# Patient Record
Sex: FEMALE | Race: WHITE | NOT HISPANIC OR LATINO
[De-identification: names, ages, dates, MRNs, and addresses within clinical notes are randomized per-mention and may not be internally consistent; named-entity substitution may affect disease eponyms.]

---

## 2017-01-03 ENCOUNTER — RX RENEWAL (OUTPATIENT)
Age: 72
End: 2017-01-03

## 2018-02-26 ENCOUNTER — APPOINTMENT (OUTPATIENT)
Dept: ORTHOPEDIC SURGERY | Facility: CLINIC | Age: 73
End: 2018-02-26
Payer: COMMERCIAL

## 2018-02-26 VITALS — BODY MASS INDEX: 21.16 KG/M2 | WEIGHT: 115 LBS | HEIGHT: 62 IN

## 2018-02-26 VITALS — HEART RATE: 91 BPM | SYSTOLIC BLOOD PRESSURE: 124 MMHG | DIASTOLIC BLOOD PRESSURE: 74 MMHG

## 2018-02-26 PROCEDURE — 72100 X-RAY EXAM L-S SPINE 2/3 VWS: CPT

## 2018-02-26 PROCEDURE — 99214 OFFICE O/P EST MOD 30 MIN: CPT

## 2018-03-07 ENCOUNTER — RX RENEWAL (OUTPATIENT)
Age: 73
End: 2018-03-07

## 2018-05-10 ENCOUNTER — RX RENEWAL (OUTPATIENT)
Age: 73
End: 2018-05-10

## 2018-06-05 ENCOUNTER — OUTPATIENT (OUTPATIENT)
Dept: OUTPATIENT SERVICES | Facility: HOSPITAL | Age: 73
LOS: 1 days | End: 2018-06-05
Payer: COMMERCIAL

## 2018-06-05 VITALS
RESPIRATION RATE: 20 BRPM | TEMPERATURE: 98 F | SYSTOLIC BLOOD PRESSURE: 110 MMHG | OXYGEN SATURATION: 97 % | HEIGHT: 61 IN | HEART RATE: 74 BPM | DIASTOLIC BLOOD PRESSURE: 70 MMHG | WEIGHT: 115.96 LBS

## 2018-06-05 DIAGNOSIS — M48.07 SPINAL STENOSIS, LUMBOSACRAL REGION: ICD-10-CM

## 2018-06-05 DIAGNOSIS — M48.00 SPINAL STENOSIS, SITE UNSPECIFIED: ICD-10-CM

## 2018-06-05 DIAGNOSIS — Z90.49 ACQUIRED ABSENCE OF OTHER SPECIFIED PARTS OF DIGESTIVE TRACT: Chronic | ICD-10-CM

## 2018-06-05 DIAGNOSIS — T78.40XA ALLERGY, UNSPECIFIED, INITIAL ENCOUNTER: ICD-10-CM

## 2018-06-05 DIAGNOSIS — K43.2 INCISIONAL HERNIA WITHOUT OBSTRUCTION OR GANGRENE: Chronic | ICD-10-CM

## 2018-06-05 LAB
BLD GP AB SCN SERPL QL: NEGATIVE — SIGNIFICANT CHANGE UP
BUN SERPL-MCNC: 16 MG/DL — SIGNIFICANT CHANGE UP (ref 7–23)
CALCIUM SERPL-MCNC: 9.3 MG/DL — SIGNIFICANT CHANGE UP (ref 8.4–10.5)
CHLORIDE SERPL-SCNC: 101 MMOL/L — SIGNIFICANT CHANGE UP (ref 98–107)
CO2 SERPL-SCNC: 27 MMOL/L — SIGNIFICANT CHANGE UP (ref 22–31)
CREAT SERPL-MCNC: 0.7 MG/DL — SIGNIFICANT CHANGE UP (ref 0.5–1.3)
GLUCOSE SERPL-MCNC: 89 MG/DL — SIGNIFICANT CHANGE UP (ref 70–99)
HCT VFR BLD CALC: 44.8 % — SIGNIFICANT CHANGE UP (ref 34.5–45)
HGB BLD-MCNC: 14.8 G/DL — SIGNIFICANT CHANGE UP (ref 11.5–15.5)
MCHC RBC-ENTMCNC: 30.9 PG — SIGNIFICANT CHANGE UP (ref 27–34)
MCHC RBC-ENTMCNC: 33 % — SIGNIFICANT CHANGE UP (ref 32–36)
MCV RBC AUTO: 93.5 FL — SIGNIFICANT CHANGE UP (ref 80–100)
NRBC # FLD: 0 — SIGNIFICANT CHANGE UP
PLATELET # BLD AUTO: 298 K/UL — SIGNIFICANT CHANGE UP (ref 150–400)
PMV BLD: 10.6 FL — SIGNIFICANT CHANGE UP (ref 7–13)
POTASSIUM SERPL-MCNC: 4.3 MMOL/L — SIGNIFICANT CHANGE UP (ref 3.5–5.3)
POTASSIUM SERPL-SCNC: 4.3 MMOL/L — SIGNIFICANT CHANGE UP (ref 3.5–5.3)
RBC # BLD: 4.79 M/UL — SIGNIFICANT CHANGE UP (ref 3.8–5.2)
RBC # FLD: 13.2 % — SIGNIFICANT CHANGE UP (ref 10.3–14.5)
RH IG SCN BLD-IMP: NEGATIVE — SIGNIFICANT CHANGE UP
SODIUM SERPL-SCNC: 140 MMOL/L — SIGNIFICANT CHANGE UP (ref 135–145)
WBC # BLD: 12.5 K/UL — HIGH (ref 3.8–10.5)
WBC # FLD AUTO: 12.5 K/UL — HIGH (ref 3.8–10.5)

## 2018-06-05 PROCEDURE — 93010 ELECTROCARDIOGRAM REPORT: CPT

## 2018-06-05 NOTE — H&P PST ADULT - HISTORY OF PRESENT ILLNESS
Pt is a 72 y.o. female ; pt reports h/o chronic lower back pain first noted 2 years ago ; pt to surgeon ; a w/u was done " hernaited disc" Pt now presents for Lumbar Laminectomy L5-S1 Pt is a 72 y.o. female ; pt reports h/o chronic lower back pain first noted 2 years ago ; pt to surgeon ; a w/u was done " herniated disc" Pt now presents for Lumbar Laminectomy L5-S1

## 2018-06-05 NOTE — H&P PST ADULT - PROBLEM SELECTOR PLAN 1
Lumbar Laminectomy    Pre op instructions including Pepcid given to pt ; pt appears to have a good understanding of pre op instructions    Pt to Dr Bowman for pre op m/c

## 2018-06-05 NOTE — H&P PST ADULT - FAMILY HISTORY
Sibling  Still living? Yes, Estimated age: Age Unknown  Family history of breast cancer, Age at diagnosis: Age Unknown

## 2018-06-05 NOTE — H&P PST ADULT - PMH
Diverticulitis  last flare up > 3 years ago  Hypercholesterolemia    Lumbar disc herniation Diverticulitis  last flare up > 3 years ago  Hypercholesterolemia    Lumbar disc herniation    Smoking history

## 2018-06-05 NOTE — H&P PST ADULT - DOCUMENT STATUS
August 31, 2017      Estefani Feliciano PA-C  [address]  [facility] [address]           [facility]  [address] [facility] [address]  Phone: [phone]  Fax: [phone]          Patient: Reema Barrios   MR Number: [id number]   YOB: 1973   Date of Visit: 8/31/2017       Dear Estefani Feliciano:    Thank you for referring Reema Barrios to me for evaluation. Attached you will find relevant portions of my assessment and plan of care.    If you have questions, please do not hesitate to call me. I look forward to following Reema Barrios along with you.    Sincerely,    Tay Lei MD    Enclosure  CC:  No Recipients    If you would like to receive this communication electronically, please contact [email or web address] or [phone] to request more information on [facility] access.    For providers and/or their staff who would like to refer a patient to [facility], please contact us through our one-stop-shop provider referral line, [facility], at [phone].    If you feel you have received this communication in error or would no longer like to receive these types of communications, please e-mail [email or web address]          Authored by Resident/PA/NP

## 2018-06-05 NOTE — H&P PST ADULT - RS GEN PE MLT RESP DETAILS PC
clear to auscultation bilaterally/respirations non-labored/good air movement/o2 sat 97%/airway patent/breath sounds equal

## 2018-06-05 NOTE — H&P PST ADULT - PSH
Incisional hernia  s/p repair  S/P appendectomy    S/P Colectomy  sigmoid resection  S/P Hysterectomy

## 2018-06-05 NOTE — H&P PST ADULT - LYMPHATIC
supraclavicular R/supraclavicular L/anterior cervical R/posterior cervical R/anterior cervical L/posterior cervical L

## 2018-06-05 NOTE — H&P PST ADULT - NSANTHOSAYNRD_GEN_A_CORE
No. VAZQUEZ screening performed.  STOP BANG Legend: 0-2 = LOW Risk; 3-4 = INTERMEDIATE Risk; 5-8 = HIGH Risk

## 2018-06-06 LAB — SPECIMEN SOURCE: SIGNIFICANT CHANGE UP

## 2018-06-07 LAB — BACTERIA NPH CULT: SIGNIFICANT CHANGE UP

## 2018-06-18 NOTE — ASU PATIENT PROFILE, ADULT - PMH
Diverticulitis  last flare up > 3 years ago  Hypercholesterolemia    Lumbar disc herniation    Smoking history

## 2018-06-19 ENCOUNTER — APPOINTMENT (OUTPATIENT)
Dept: ORTHOPEDIC SURGERY | Facility: HOSPITAL | Age: 73
End: 2018-06-19
Payer: COMMERCIAL

## 2018-06-19 ENCOUNTER — RESULT REVIEW (OUTPATIENT)
Age: 73
End: 2018-06-19

## 2018-06-19 ENCOUNTER — INPATIENT (INPATIENT)
Facility: HOSPITAL | Age: 73
LOS: 2 days | Discharge: HOME | End: 2018-06-22
Attending: ORTHOPAEDIC SURGERY | Admitting: ORTHOPAEDIC SURGERY
Payer: COMMERCIAL

## 2018-06-19 VITALS
RESPIRATION RATE: 16 BRPM | WEIGHT: 115.96 LBS | TEMPERATURE: 99 F | DIASTOLIC BLOOD PRESSURE: 72 MMHG | OXYGEN SATURATION: 98 % | SYSTOLIC BLOOD PRESSURE: 123 MMHG | HEIGHT: 61 IN | HEART RATE: 70 BPM

## 2018-06-19 DIAGNOSIS — Z90.49 ACQUIRED ABSENCE OF OTHER SPECIFIED PARTS OF DIGESTIVE TRACT: Chronic | ICD-10-CM

## 2018-06-19 DIAGNOSIS — K43.2 INCISIONAL HERNIA WITHOUT OBSTRUCTION OR GANGRENE: Chronic | ICD-10-CM

## 2018-06-19 DIAGNOSIS — M48.07 SPINAL STENOSIS, LUMBOSACRAL REGION: ICD-10-CM

## 2018-06-19 LAB
BASOPHILS # BLD AUTO: 0.03 K/UL — SIGNIFICANT CHANGE UP (ref 0–0.2)
BASOPHILS NFR BLD AUTO: 0.2 % — SIGNIFICANT CHANGE UP (ref 0–2)
BUN SERPL-MCNC: 15 MG/DL — SIGNIFICANT CHANGE UP (ref 7–23)
CALCIUM SERPL-MCNC: 8.5 MG/DL — SIGNIFICANT CHANGE UP (ref 8.4–10.5)
CHLORIDE SERPL-SCNC: 101 MMOL/L — SIGNIFICANT CHANGE UP (ref 98–107)
CO2 SERPL-SCNC: 25 MMOL/L — SIGNIFICANT CHANGE UP (ref 22–31)
CREAT SERPL-MCNC: 0.68 MG/DL — SIGNIFICANT CHANGE UP (ref 0.5–1.3)
EOSINOPHIL # BLD AUTO: 0.01 K/UL — SIGNIFICANT CHANGE UP (ref 0–0.5)
EOSINOPHIL NFR BLD AUTO: 0.1 % — SIGNIFICANT CHANGE UP (ref 0–6)
GLUCOSE SERPL-MCNC: 141 MG/DL — HIGH (ref 70–99)
HCT VFR BLD CALC: 41.3 % — SIGNIFICANT CHANGE UP (ref 34.5–45)
HGB BLD-MCNC: 13.8 G/DL — SIGNIFICANT CHANGE UP (ref 11.5–15.5)
IMM GRANULOCYTES # BLD AUTO: 0.05 # — SIGNIFICANT CHANGE UP
IMM GRANULOCYTES NFR BLD AUTO: 0.4 % — SIGNIFICANT CHANGE UP (ref 0–1.5)
LYMPHOCYTES # BLD AUTO: 0.79 K/UL — LOW (ref 1–3.3)
LYMPHOCYTES # BLD AUTO: 5.9 % — LOW (ref 13–44)
MCHC RBC-ENTMCNC: 31.3 PG — SIGNIFICANT CHANGE UP (ref 27–34)
MCHC RBC-ENTMCNC: 33.4 % — SIGNIFICANT CHANGE UP (ref 32–36)
MCV RBC AUTO: 93.7 FL — SIGNIFICANT CHANGE UP (ref 80–100)
MONOCYTES # BLD AUTO: 0.11 K/UL — SIGNIFICANT CHANGE UP (ref 0–0.9)
MONOCYTES NFR BLD AUTO: 0.8 % — LOW (ref 2–14)
NEUTROPHILS # BLD AUTO: 12.38 K/UL — HIGH (ref 1.8–7.4)
NEUTROPHILS NFR BLD AUTO: 92.6 % — HIGH (ref 43–77)
NRBC # FLD: 0 — SIGNIFICANT CHANGE UP
PLATELET # BLD AUTO: 283 K/UL — SIGNIFICANT CHANGE UP (ref 150–400)
PMV BLD: 10.3 FL — SIGNIFICANT CHANGE UP (ref 7–13)
POTASSIUM SERPL-MCNC: 3.9 MMOL/L — SIGNIFICANT CHANGE UP (ref 3.5–5.3)
POTASSIUM SERPL-SCNC: 3.9 MMOL/L — SIGNIFICANT CHANGE UP (ref 3.5–5.3)
RBC # BLD: 4.41 M/UL — SIGNIFICANT CHANGE UP (ref 3.8–5.2)
RBC # FLD: 13.2 % — SIGNIFICANT CHANGE UP (ref 10.3–14.5)
RH IG SCN BLD-IMP: NEGATIVE — SIGNIFICANT CHANGE UP
SODIUM SERPL-SCNC: 138 MMOL/L — SIGNIFICANT CHANGE UP (ref 135–145)
WBC # BLD: 13.37 K/UL — HIGH (ref 3.8–10.5)
WBC # FLD AUTO: 13.37 K/UL — HIGH (ref 3.8–10.5)

## 2018-06-19 PROCEDURE — 63030 LAMOT DCMPRN NRV RT 1 LMBR: CPT | Mod: AS,59,LT

## 2018-06-19 PROCEDURE — 88304 TISSUE EXAM BY PATHOLOGIST: CPT | Mod: 26

## 2018-06-19 PROCEDURE — 72100 X-RAY EXAM L-S SPINE 2/3 VWS: CPT | Mod: 26

## 2018-06-19 PROCEDURE — 63011 REMOVE SPINE LAMINA 1/2 SCRL: CPT | Mod: AS

## 2018-06-19 PROCEDURE — 63047 LAM FACETEC & FORAMOT LUMBAR: CPT | Mod: AS

## 2018-06-19 RX ORDER — PSYLLIUM SEED (WITH DEXTROSE)
1 POWDER (GRAM) ORAL DAILY
Qty: 0 | Refills: 0 | Status: DISCONTINUED | OUTPATIENT
Start: 2018-06-19 | End: 2018-06-22

## 2018-06-19 RX ORDER — FENTANYL CITRATE 50 UG/ML
25 INJECTION INTRAVENOUS
Qty: 0 | Refills: 0 | Status: DISCONTINUED | OUTPATIENT
Start: 2018-06-19 | End: 2018-06-19

## 2018-06-19 RX ORDER — ONDANSETRON 8 MG/1
4 TABLET, FILM COATED ORAL EVERY 6 HOURS
Qty: 0 | Refills: 0 | Status: DISCONTINUED | OUTPATIENT
Start: 2018-06-19 | End: 2018-06-22

## 2018-06-19 RX ORDER — ATORVASTATIN CALCIUM 80 MG/1
20 TABLET, FILM COATED ORAL AT BEDTIME
Qty: 0 | Refills: 0 | Status: DISCONTINUED | OUTPATIENT
Start: 2018-06-19 | End: 2018-06-22

## 2018-06-19 RX ORDER — HYDROMORPHONE HYDROCHLORIDE 2 MG/ML
0.5 INJECTION INTRAMUSCULAR; INTRAVENOUS; SUBCUTANEOUS EVERY 4 HOURS
Qty: 0 | Refills: 0 | Status: DISCONTINUED | OUTPATIENT
Start: 2018-06-19 | End: 2018-06-22

## 2018-06-19 RX ORDER — HYDROMORPHONE HYDROCHLORIDE 2 MG/ML
0.5 INJECTION INTRAMUSCULAR; INTRAVENOUS; SUBCUTANEOUS
Qty: 0 | Refills: 0 | Status: DISCONTINUED | OUTPATIENT
Start: 2018-06-19 | End: 2018-06-19

## 2018-06-19 RX ORDER — CEFAZOLIN SODIUM 1 G
2000 VIAL (EA) INJECTION EVERY 8 HOURS
Qty: 0 | Refills: 0 | Status: COMPLETED | OUTPATIENT
Start: 2018-06-19 | End: 2018-06-20

## 2018-06-19 RX ORDER — PANTOPRAZOLE SODIUM 20 MG/1
40 TABLET, DELAYED RELEASE ORAL
Qty: 0 | Refills: 0 | Status: DISCONTINUED | OUTPATIENT
Start: 2018-06-19 | End: 2018-06-22

## 2018-06-19 RX ORDER — SENNA PLUS 8.6 MG/1
2 TABLET ORAL AT BEDTIME
Qty: 0 | Refills: 0 | Status: DISCONTINUED | OUTPATIENT
Start: 2018-06-19 | End: 2018-06-22

## 2018-06-19 RX ORDER — ONDANSETRON 8 MG/1
4 TABLET, FILM COATED ORAL ONCE
Qty: 0 | Refills: 0 | Status: COMPLETED | OUTPATIENT
Start: 2018-06-19 | End: 2018-06-19

## 2018-06-19 RX ORDER — SODIUM CHLORIDE 9 MG/ML
1000 INJECTION, SOLUTION INTRAVENOUS
Qty: 0 | Refills: 0 | Status: DISCONTINUED | OUTPATIENT
Start: 2018-06-19 | End: 2018-06-22

## 2018-06-19 RX ORDER — ACETAMINOPHEN 500 MG
650 TABLET ORAL EVERY 6 HOURS
Qty: 0 | Refills: 0 | Status: DISCONTINUED | OUTPATIENT
Start: 2018-06-19 | End: 2018-06-21

## 2018-06-19 RX ORDER — OXYCODONE HYDROCHLORIDE 5 MG/1
10 TABLET ORAL EVERY 4 HOURS
Qty: 0 | Refills: 0 | Status: DISCONTINUED | OUTPATIENT
Start: 2018-06-19 | End: 2018-06-21

## 2018-06-19 RX ORDER — CALCIUM CARBONATE 500(1250)
1 TABLET ORAL THREE TIMES A DAY
Qty: 0 | Refills: 0 | Status: DISCONTINUED | OUTPATIENT
Start: 2018-06-19 | End: 2018-06-22

## 2018-06-19 RX ORDER — SODIUM CHLORIDE 9 MG/ML
500 INJECTION, SOLUTION INTRAVENOUS ONCE
Qty: 0 | Refills: 0 | Status: COMPLETED | OUTPATIENT
Start: 2018-06-19 | End: 2018-06-19

## 2018-06-19 RX ORDER — MAGNESIUM HYDROXIDE 400 MG/1
30 TABLET, CHEWABLE ORAL EVERY 12 HOURS
Qty: 0 | Refills: 0 | Status: DISCONTINUED | OUTPATIENT
Start: 2018-06-19 | End: 2018-06-22

## 2018-06-19 RX ORDER — OXYCODONE HYDROCHLORIDE 5 MG/1
5 TABLET ORAL EVERY 4 HOURS
Qty: 0 | Refills: 0 | Status: DISCONTINUED | OUTPATIENT
Start: 2018-06-19 | End: 2018-06-21

## 2018-06-19 RX ORDER — NICOTINE POLACRILEX 2 MG
1 GUM BUCCAL DAILY
Qty: 0 | Refills: 0 | Status: DISCONTINUED | OUTPATIENT
Start: 2018-06-19 | End: 2018-06-20

## 2018-06-19 RX ORDER — METOCLOPRAMIDE HCL 10 MG
10 TABLET ORAL ONCE
Qty: 0 | Refills: 0 | Status: COMPLETED | OUTPATIENT
Start: 2018-06-19 | End: 2018-06-19

## 2018-06-19 RX ORDER — DIAZEPAM 5 MG
5 TABLET ORAL EVERY 12 HOURS
Qty: 0 | Refills: 0 | Status: DISCONTINUED | OUTPATIENT
Start: 2018-06-19 | End: 2018-06-22

## 2018-06-19 RX ADMIN — HYDROMORPHONE HYDROCHLORIDE 0.5 MILLIGRAM(S): 2 INJECTION INTRAMUSCULAR; INTRAVENOUS; SUBCUTANEOUS at 14:45

## 2018-06-19 RX ADMIN — OXYCODONE HYDROCHLORIDE 5 MILLIGRAM(S): 5 TABLET ORAL at 18:57

## 2018-06-19 RX ADMIN — ATORVASTATIN CALCIUM 20 MILLIGRAM(S): 80 TABLET, FILM COATED ORAL at 21:14

## 2018-06-19 RX ADMIN — Medication 650 MILLIGRAM(S): at 21:14

## 2018-06-19 RX ADMIN — Medication 10 MILLIGRAM(S): at 15:17

## 2018-06-19 RX ADMIN — HYDROMORPHONE HYDROCHLORIDE 0.5 MILLIGRAM(S): 2 INJECTION INTRAMUSCULAR; INTRAVENOUS; SUBCUTANEOUS at 14:30

## 2018-06-19 RX ADMIN — SENNA PLUS 2 TABLET(S): 8.6 TABLET ORAL at 21:14

## 2018-06-19 RX ADMIN — Medication 100 MILLIGRAM(S): at 19:49

## 2018-06-19 RX ADMIN — ONDANSETRON 4 MILLIGRAM(S): 8 TABLET, FILM COATED ORAL at 16:23

## 2018-06-19 RX ADMIN — OXYCODONE HYDROCHLORIDE 10 MILLIGRAM(S): 5 TABLET ORAL at 23:21

## 2018-06-19 RX ADMIN — OXYCODONE HYDROCHLORIDE 10 MILLIGRAM(S): 5 TABLET ORAL at 23:20

## 2018-06-19 RX ADMIN — ONDANSETRON 4 MILLIGRAM(S): 8 TABLET, FILM COATED ORAL at 14:40

## 2018-06-19 RX ADMIN — SODIUM CHLORIDE 1000 MILLILITER(S): 9 INJECTION, SOLUTION INTRAVENOUS at 16:24

## 2018-06-19 NOTE — BRIEF OPERATIVE NOTE - PROCEDURE
<<-----Click on this checkbox to enter Procedure Laminectomy of lumbosacral spine  06/19/2018    Active  ASATIN

## 2018-06-20 ENCOUNTER — TRANSCRIPTION ENCOUNTER (OUTPATIENT)
Age: 73
End: 2018-06-20

## 2018-06-20 DIAGNOSIS — M51.26 OTHER INTERVERTEBRAL DISC DISPLACEMENT, LUMBAR REGION: ICD-10-CM

## 2018-06-20 DIAGNOSIS — Z87.891 PERSONAL HISTORY OF NICOTINE DEPENDENCE: ICD-10-CM

## 2018-06-20 DIAGNOSIS — D72.829 ELEVATED WHITE BLOOD CELL COUNT, UNSPECIFIED: ICD-10-CM

## 2018-06-20 DIAGNOSIS — E78.00 PURE HYPERCHOLESTEROLEMIA, UNSPECIFIED: ICD-10-CM

## 2018-06-20 DIAGNOSIS — R11.0 NAUSEA: ICD-10-CM

## 2018-06-20 LAB
BUN SERPL-MCNC: 13 MG/DL — SIGNIFICANT CHANGE UP (ref 7–23)
BUN SERPL-MCNC: 13 MG/DL — SIGNIFICANT CHANGE UP (ref 7–23)
CALCIUM SERPL-MCNC: 8.6 MG/DL — SIGNIFICANT CHANGE UP (ref 8.4–10.5)
CALCIUM SERPL-MCNC: 8.7 MG/DL — SIGNIFICANT CHANGE UP (ref 8.4–10.5)
CHLORIDE SERPL-SCNC: 101 MMOL/L — SIGNIFICANT CHANGE UP (ref 98–107)
CHLORIDE SERPL-SCNC: 103 MMOL/L — SIGNIFICANT CHANGE UP (ref 98–107)
CO2 SERPL-SCNC: 26 MMOL/L — SIGNIFICANT CHANGE UP (ref 22–31)
CO2 SERPL-SCNC: 28 MMOL/L — SIGNIFICANT CHANGE UP (ref 22–31)
CREAT SERPL-MCNC: 0.58 MG/DL — SIGNIFICANT CHANGE UP (ref 0.5–1.3)
CREAT SERPL-MCNC: 0.59 MG/DL — SIGNIFICANT CHANGE UP (ref 0.5–1.3)
GLUCOSE SERPL-MCNC: 112 MG/DL — HIGH (ref 70–99)
GLUCOSE SERPL-MCNC: 120 MG/DL — HIGH (ref 70–99)
HCT VFR BLD CALC: 38.5 % — SIGNIFICANT CHANGE UP (ref 34.5–45)
HGB BLD-MCNC: 13.2 G/DL — SIGNIFICANT CHANGE UP (ref 11.5–15.5)
MCHC RBC-ENTMCNC: 31.1 PG — SIGNIFICANT CHANGE UP (ref 27–34)
MCHC RBC-ENTMCNC: 34.3 % — SIGNIFICANT CHANGE UP (ref 32–36)
MCV RBC AUTO: 90.6 FL — SIGNIFICANT CHANGE UP (ref 80–100)
NRBC # FLD: 0 — SIGNIFICANT CHANGE UP
PLATELET # BLD AUTO: 240 K/UL — SIGNIFICANT CHANGE UP (ref 150–400)
PMV BLD: 10 FL — SIGNIFICANT CHANGE UP (ref 7–13)
POTASSIUM SERPL-MCNC: 4.1 MMOL/L — SIGNIFICANT CHANGE UP (ref 3.5–5.3)
POTASSIUM SERPL-MCNC: 4.5 MMOL/L — SIGNIFICANT CHANGE UP (ref 3.5–5.3)
POTASSIUM SERPL-SCNC: 4.1 MMOL/L — SIGNIFICANT CHANGE UP (ref 3.5–5.3)
POTASSIUM SERPL-SCNC: 4.5 MMOL/L — SIGNIFICANT CHANGE UP (ref 3.5–5.3)
RBC # BLD: 4.25 M/UL — SIGNIFICANT CHANGE UP (ref 3.8–5.2)
RBC # FLD: 13.2 % — SIGNIFICANT CHANGE UP (ref 10.3–14.5)
SODIUM SERPL-SCNC: 139 MMOL/L — SIGNIFICANT CHANGE UP (ref 135–145)
SODIUM SERPL-SCNC: 143 MMOL/L — SIGNIFICANT CHANGE UP (ref 135–145)
WBC # BLD: 17.94 K/UL — HIGH (ref 3.8–10.5)
WBC # FLD AUTO: 17.94 K/UL — HIGH (ref 3.8–10.5)

## 2018-06-20 PROCEDURE — 99223 1ST HOSP IP/OBS HIGH 75: CPT

## 2018-06-20 RX ORDER — SODIUM CHLORIDE 9 MG/ML
500 INJECTION INTRAMUSCULAR; INTRAVENOUS; SUBCUTANEOUS ONCE
Qty: 0 | Refills: 0 | Status: COMPLETED | OUTPATIENT
Start: 2018-06-20 | End: 2018-06-20

## 2018-06-20 RX ORDER — METOCLOPRAMIDE HCL 10 MG
10 TABLET ORAL ONCE
Qty: 0 | Refills: 0 | Status: COMPLETED | OUTPATIENT
Start: 2018-06-20 | End: 2018-06-20

## 2018-06-20 RX ORDER — BENZOCAINE AND MENTHOL 5; 1 G/100ML; G/100ML
1 LIQUID ORAL
Qty: 0 | Refills: 0 | Status: DISCONTINUED | OUTPATIENT
Start: 2018-06-20 | End: 2018-06-22

## 2018-06-20 RX ORDER — NICOTINE POLACRILEX 2 MG
1 GUM BUCCAL DAILY
Qty: 0 | Refills: 0 | Status: DISCONTINUED | OUTPATIENT
Start: 2018-06-20 | End: 2018-06-22

## 2018-06-20 RX ORDER — DOCUSATE SODIUM 100 MG
100 CAPSULE ORAL THREE TIMES A DAY
Qty: 0 | Refills: 0 | Status: DISCONTINUED | OUTPATIENT
Start: 2018-06-20 | End: 2018-06-22

## 2018-06-20 RX ADMIN — BENZOCAINE AND MENTHOL 1 LOZENGE: 5; 1 LIQUID ORAL at 09:12

## 2018-06-20 RX ADMIN — Medication 10 MILLIGRAM(S): at 09:12

## 2018-06-20 RX ADMIN — Medication 100 MILLIGRAM(S): at 22:38

## 2018-06-20 RX ADMIN — HYDROMORPHONE HYDROCHLORIDE 0.5 MILLIGRAM(S): 2 INJECTION INTRAMUSCULAR; INTRAVENOUS; SUBCUTANEOUS at 01:27

## 2018-06-20 RX ADMIN — HYDROMORPHONE HYDROCHLORIDE 0.5 MILLIGRAM(S): 2 INJECTION INTRAMUSCULAR; INTRAVENOUS; SUBCUTANEOUS at 18:40

## 2018-06-20 RX ADMIN — PANTOPRAZOLE SODIUM 40 MILLIGRAM(S): 20 TABLET, DELAYED RELEASE ORAL at 06:50

## 2018-06-20 RX ADMIN — HYDROMORPHONE HYDROCHLORIDE 0.5 MILLIGRAM(S): 2 INJECTION INTRAMUSCULAR; INTRAVENOUS; SUBCUTANEOUS at 18:55

## 2018-06-20 RX ADMIN — Medication 1 TABLET(S): at 12:39

## 2018-06-20 RX ADMIN — Medication 5 MILLIGRAM(S): at 12:39

## 2018-06-20 RX ADMIN — ONDANSETRON 4 MILLIGRAM(S): 8 TABLET, FILM COATED ORAL at 22:37

## 2018-06-20 RX ADMIN — OXYCODONE HYDROCHLORIDE 10 MILLIGRAM(S): 5 TABLET ORAL at 23:31

## 2018-06-20 RX ADMIN — ONDANSETRON 4 MILLIGRAM(S): 8 TABLET, FILM COATED ORAL at 15:39

## 2018-06-20 RX ADMIN — OXYCODONE HYDROCHLORIDE 5 MILLIGRAM(S): 5 TABLET ORAL at 16:30

## 2018-06-20 RX ADMIN — ATORVASTATIN CALCIUM 20 MILLIGRAM(S): 80 TABLET, FILM COATED ORAL at 22:38

## 2018-06-20 RX ADMIN — OXYCODONE HYDROCHLORIDE 5 MILLIGRAM(S): 5 TABLET ORAL at 15:39

## 2018-06-20 RX ADMIN — Medication 100 MILLIGRAM(S): at 03:58

## 2018-06-20 RX ADMIN — SENNA PLUS 2 TABLET(S): 8.6 TABLET ORAL at 22:38

## 2018-06-20 RX ADMIN — SODIUM CHLORIDE 2000 MILLILITER(S): 9 INJECTION INTRAMUSCULAR; INTRAVENOUS; SUBCUTANEOUS at 09:13

## 2018-06-20 RX ADMIN — OXYCODONE HYDROCHLORIDE 10 MILLIGRAM(S): 5 TABLET ORAL at 22:37

## 2018-06-20 RX ADMIN — ONDANSETRON 4 MILLIGRAM(S): 8 TABLET, FILM COATED ORAL at 05:29

## 2018-06-20 RX ADMIN — HYDROMORPHONE HYDROCHLORIDE 0.5 MILLIGRAM(S): 2 INJECTION INTRAMUSCULAR; INTRAVENOUS; SUBCUTANEOUS at 05:30

## 2018-06-20 RX ADMIN — Medication 100 MILLIGRAM(S): at 14:29

## 2018-06-20 RX ADMIN — Medication 1 PATCH: at 14:40

## 2018-06-20 NOTE — DISCHARGE NOTE ADULT - NS AS DC FOLLOWUP STROKE INST
Smoking Cessation/carenotes on laminectomy, d/c medications, side effects pamphlet, stop smoking carenotes

## 2018-06-20 NOTE — DISCHARGE NOTE ADULT - MEDICATION SUMMARY - MEDICATIONS TO STOP TAKING
I will STOP taking the medications listed below when I get home from the hospital:    diclofenac sodium 75 mg oral delayed release tablet  -- 1 tab(s) by mouth once a day. loast dose 6/12/2018    Aleve  -- 1-2 tab oral daily. last dose 6/12/2018

## 2018-06-20 NOTE — DISCHARGE NOTE ADULT - MEDICATION SUMMARY - MEDICATIONS TO TAKE
I will START or STAY ON the medications listed below when I get home from the hospital:    Vitamin D  -- 1 tab(s) by mouth once a day  -- Indication: For Home med    HYDROmorphone 2 mg oral tablet  -- 1-2 tab(s) by mouth every 4- 6 hours, As Needed Pain MDD:12  -- Indication: For pain med    Caltrate 600 mg oral tablet  -- 1 tab(s) by mouth once a day  -- Indication: For Home med    diazePAM 5 mg oral tablet  -- 1 tab(s) by mouth every 12 hours, As needed, Spasm MDD:2  -- Indication: For muscle spasms    atorvastatin 20 mg oral tablet  -- 1 tab(s) by mouth once a day  -- Indication: For Home med    Metamucil  -- orally once a day  -- Indication: For Home med    docusate sodium 100 mg oral capsule  -- 1 cap(s) by mouth 3 times a day  -- Indication: For Stool softener    senna oral tablet  -- 2 tab(s) by mouth once a day (at bedtime), As Needed - for constipation  -- Indication: For Laxative    pantoprazole 40 mg oral delayed release tablet  -- 1 tab(s) by mouth once a day (before a meal)  -- Indication: For GI protection post op    multivitamin  -- 1 tab(s) by mouth once a day. last dose 6/12/2018.  -- Indication: For Home med    Vitamin C  -- 1 tab(s) by mouth once a day  -- Indication: For Home med

## 2018-06-20 NOTE — DISCHARGE NOTE ADULT - INSTRUCTIONS
no change Notify Dr Villa if you experience any increase in pain not relieved with pain medication, any redness, drainage or swelling around incision or any fever >100.5  [place]n proper body alignment, no bending or twisting at the waist, no heavy lifting.  Do not sit in a chair for longer than 45 minutes at a time.  Dink plenty of fluids.  Notify Dr Braxton if you develop any change in bowel or bladder control, or if you develop any numbness or tingling in extremities..  Use over the counter stool softeners to assist with constipation which can be a side effect of your pain medication.

## 2018-06-20 NOTE — CONSULT NOTE ADULT - ASSESSMENT
71 yo woman current smoker PMH HLD chronic lower back pain for 2 years found to have a herniated disc now s/p scheduled L5-S1 Lumbar Laminectomy 6/20/18, POD#1

## 2018-06-20 NOTE — CONSULT NOTE ADULT - SUBJECTIVE AND OBJECTIVE BOX
CHIEF COMPLAINT: Patient is a 72y old  Female who presents with a chief complaint of lumbar lami (19 Jun 2018 18:30)      HPI: 73 yo woman current smoker PMH HLD chronic lower back pain for 2 years found to have a herniated disc now presented for scheduled L5-S1 Lumbar Laminectomy 6/20/18. Pt tolerated the surgery well.    Pain Symptoms if applicable:             	                         none	   mild         moderate         severe  	                            0	    1-3	     4-6	         7-10  Pain:  Location:	  Modifying factors:	  Associated symptoms:	    Allergies    sulfamethoxazole (Nausea)    Intolerances        HOME MEDICATIONS: [x] Reviewed    MEDICATIONS  (STANDING):  atorvastatin 20 milliGRAM(s) Oral at bedtime  docusate sodium 100 milliGRAM(s) Oral three times a day  lactated ringers. 1000 milliLiter(s) (75 mL/Hr) IV Continuous <Continuous>  multivitamin 1 Tablet(s) Oral daily  nicotine -   7 mG/24Hr(s) Patch 1 patch Transdermal daily  nicotine - 21 mG/24Hr(s) Patch 1 patch Transdermal daily  pantoprazole    Tablet 40 milliGRAM(s) Oral before breakfast  psyllium Powder 1 Packet(s) Oral daily  senna 2 Tablet(s) Oral at bedtime    MEDICATIONS  (PRN):  acetaminophen   Tablet 650 milliGRAM(s) Oral every 6 hours PRN For Temp greater than 38 C (100.4 F)  acetaminophen   Tablet. 650 milliGRAM(s) Oral every 6 hours PRN Mild Pain (1 - 3)  benzocaine 15 mG/menthol 3.6 mG Lozenge 1 Lozenge Oral every 3 hours PRN Sore Throat  calcium carbonate 500 mG (Tums) Chewable 1 Tablet(s) Chew three times a day PRN Dyspepsia  diazepam    Tablet 5 milliGRAM(s) Oral every 12 hours PRN Spasm  HYDROmorphone  Injectable 0.5 milliGRAM(s) IV Push every 4 hours PRN Breakthrough pain  magnesium hydroxide Suspension 30 milliLiter(s) Oral every 12 hours PRN Constipation  ondansetron Injectable 4 milliGRAM(s) IV Push every 6 hours PRN Nausea  oxyCODONE    IR 5 milliGRAM(s) Oral every 4 hours PRN Moderate Pain (4 - 6)  oxyCODONE    IR 10 milliGRAM(s) Oral every 4 hours PRN Severe Pain (7 - 10)      PAST MEDICAL & SURGICAL HISTORY:  Smoking history  Diverticulitis: last flare up &gt; 3 years ago  Hypercholesterolemia  Lumbar disc herniation  No Past Medical History  Incisional hernia: s/p repair  S/P appendectomy  S/P Colectomy: sigmoid resection  S/P Hysterectomy  [x ] Reviewed     SOCIAL HISTORY:  [x] Substance abuse: Denies  [x] Alcohol use: Drinks 1-2 drinks weekly. CAGE neg  [x] Tobacco: Current smoker. 44 pack year smoking history    FAMILY HISTORY:  Family history of breast cancer (Sibling)    REVIEW OF SYSTEMS:  [x] All other ROS negative  [  ] Unable to obtain due to poor mental status    Vital Signs Last 24 Hrs  T(C): 36.7 (20 Jun 2018 08:40), Max: 37.3 (19 Jun 2018 10:16)  T(F): 98.1 (20 Jun 2018 08:40), Max: 99.1 (19 Jun 2018 10:16)  HR: 88 (20 Jun 2018 08:40) (64 - 91)  BP: 104/48 (20 Jun 2018 08:40) (102/57 - 148/57)  BP(mean): 73 (19 Jun 2018 15:00) (73 - 83)  RR: 16 (20 Jun 2018 08:40) (12 - 18)  SpO2: 96% (20 Jun 2018 08:40) (93% - 100%)    PHYSICAL EXAM:  GENERAL: NAD, well-groomed, well-developed  HEENT: NCAT, Moist mucous membranes  NECK: Supple, No JVD  RESPIRATORY: Clear to auscultation bilaterally; No rales, rhonchi, wheezing, or rubs  CARDIOVASCULAR: S1/ S2, Regular rate and rhythm; No murmurs, rubs, or gallops  GASTROINTESTINAL: Soft, Nontender, Nondistended; Bowel sounds present  GENITOURINARY: Not examined  EXTREMITIES:  2+ Peripheral Pulses, No clubbing, cyanosis, or edema  NERVOUS SYSTEM:  Alert & Oriented X3; Moving all 4 extremities; No gross sensory deficits  HEME/LYMPH: No lymphadenopathy noted  SKIN: No rashes or lesions; Incisions C/D/I    LABS: reviewed                        13.8   13.37 )-----------( 283      ( 19 Jun 2018 16:00 )             41.3     Hemoglobin: 13.8 g/dL (06-19 @ 16:00)    06-20    139  |  101  |  13  ----------------------------<  112<H>  4.1   |  26  |  0.58    Ca    8.7      20 Jun 2018 06:00    Microbiology     RADIOLOGY & ADDITIONAL STUDIES:    EKG:   Personally Reviewed:  [x] YES     Imaging:   Personally Reviewed:  [x] YES                 [ ] Consultant(s) Notes Reviewed  [x] Care Discussed with Consultants/Other Providers: Ortho PA - discussed CHIEF COMPLAINT: Patient is a 72y old  Female who presents with a chief complaint of lumbar lami (19 Jun 2018 18:30)      HPI: 71 yo woman current smoker PMH HLD chronic lower back pain for 2 years found to have a herniated disc now presented for scheduled L5-S1 Lumbar Laminectomy 6/20/18. Pt tolerated the surgery well.    Pain Symptoms if applicable:             	                         none	   mild         moderate         severe  	                            0	    1-3	     4-6	         7-10  Pain:  Location:	  Modifying factors:	  Associated symptoms:	    Allergies    sulfamethoxazole (Nausea)    Intolerances        HOME MEDICATIONS: [x] Reviewed with patient at bedside    MEDICATIONS  (STANDING):  atorvastatin 20 milliGRAM(s) Oral at bedtime  docusate sodium 100 milliGRAM(s) Oral three times a day  lactated ringers. 1000 milliLiter(s) (75 mL/Hr) IV Continuous <Continuous>  multivitamin 1 Tablet(s) Oral daily  nicotine -   7 mG/24Hr(s) Patch 1 patch Transdermal daily  nicotine - 21 mG/24Hr(s) Patch 1 patch Transdermal daily  pantoprazole    Tablet 40 milliGRAM(s) Oral before breakfast  psyllium Powder 1 Packet(s) Oral daily  senna 2 Tablet(s) Oral at bedtime    MEDICATIONS  (PRN):  acetaminophen   Tablet 650 milliGRAM(s) Oral every 6 hours PRN For Temp greater than 38 C (100.4 F)  acetaminophen   Tablet. 650 milliGRAM(s) Oral every 6 hours PRN Mild Pain (1 - 3)  benzocaine 15 mG/menthol 3.6 mG Lozenge 1 Lozenge Oral every 3 hours PRN Sore Throat  calcium carbonate 500 mG (Tums) Chewable 1 Tablet(s) Chew three times a day PRN Dyspepsia  diazepam    Tablet 5 milliGRAM(s) Oral every 12 hours PRN Spasm  HYDROmorphone  Injectable 0.5 milliGRAM(s) IV Push every 4 hours PRN Breakthrough pain  magnesium hydroxide Suspension 30 milliLiter(s) Oral every 12 hours PRN Constipation  ondansetron Injectable 4 milliGRAM(s) IV Push every 6 hours PRN Nausea  oxyCODONE    IR 5 milliGRAM(s) Oral every 4 hours PRN Moderate Pain (4 - 6)  oxyCODONE    IR 10 milliGRAM(s) Oral every 4 hours PRN Severe Pain (7 - 10)      PAST MEDICAL & SURGICAL HISTORY:  Smoking history  Diverticulitis: last flare up &gt; 3 years ago  Hypercholesterolemia  Lumbar disc herniation  No Past Medical History  Incisional hernia: s/p repair  S/P appendectomy  S/P Colectomy: sigmoid resection  S/P Hysterectomy  [x ] Reviewed     SOCIAL HISTORY:  [x] Substance abuse: Denies  [x] Alcohol use: Drinks 1-2 drinks weekly. CAGE neg  [x] Tobacco: Current smoker. 44 pack year smoking history    FAMILY HISTORY:  Family history of breast cancer (Sibling)    REVIEW OF SYSTEMS:  [x] All other ROS negative  [  ] Unable to obtain due to poor mental status    Vital Signs Last 24 Hrs  T(C): 36.7 (20 Jun 2018 08:40), Max: 37.3 (19 Jun 2018 10:16)  T(F): 98.1 (20 Jun 2018 08:40), Max: 99.1 (19 Jun 2018 10:16)  HR: 88 (20 Jun 2018 08:40) (64 - 91)  BP: 104/48 (20 Jun 2018 08:40) (102/57 - 148/57)  BP(mean): 73 (19 Jun 2018 15:00) (73 - 83)  RR: 16 (20 Jun 2018 08:40) (12 - 18)  SpO2: 96% (20 Jun 2018 08:40) (93% - 100%)    PHYSICAL EXAM:  GENERAL: NAD, well-groomed, well-developed  HEENT: NCAT, Moist mucous membranes  NECK: Supple, No JVD  RESPIRATORY: Clear to auscultation bilaterally; No rales, rhonchi, wheezing  CARDIOVASCULAR: S1/ S2, Regular rate and rhythm  GASTROINTESTINAL: Soft, Nontender, Nondistended; Bowel sounds present  MSK: lumbar dressing +HV w/ serosanguinous drainge  EXTREMITIES:  2+ Peripheral Pulses, No clubbing, cyanosis, or edema  NERVOUS SYSTEM:        LABS: reviewed                        13.8   13.37 )-----------( 283      ( 19 Jun 2018 16:00 )             41.3     Hemoglobin: 13.8 g/dL (06-19 @ 16:00)    06-20    139  |  101  |  13  ----------------------------<  112<H>  4.1   |  26  |  0.58    Ca    8.7      20 Jun 2018 06:00    Microbiology     RADIOLOGY & ADDITIONAL STUDIES:    EKG:   Personally Reviewed:  [x] YES NSR@74bpm, nl axes, nl intervals no STT abnormalities    Imaging:   Personally Reviewed:  [x] YES                 [ ] Consultant(s) Notes Reviewed  [x] Care Discussed with Consultants/Other Providers: Ortho PA - discussed pt's nausea CHIEF COMPLAINT: Patient is a 72y old  Female who presents with a chief complaint of lumbar lami (19 Jun 2018 18:30)      HPI: 73 yo woman current smoker PMH HLD chronic lower back pain for 2 years found to have a herniated disc now presented for scheduled L5-S1 Lumbar Laminectomy 6/20/18. Pt tolerated the surgery well. She had a headache and severe nausea this am which resolved by time of my evaluation at 11:30am. She reports she had severe pain in her back 8/10 at time of my evaluation and that she needed pain medicine. Reports pain medicine brings pain down to 1/10 and makes it more tolerable.  Reports nerve pains radiating down her leg improved post-op. Only has mild neuropathic pain in L foot.   She reports she felt lightheaded when she tried to stand up to work w/ PT so was unable to do much. Thinks she is feeling better as she is starting to eat. Urinating well. Passing gas    Pain Symptoms if applicable:             	                         none	   mild         moderate         severe  	                            0	    1-3	     4-6	         7-10  Pain: 8  Location:	back  Modifying factors:	movement  Associated symptoms:	    Allergies    sulfamethoxazole (Nausea)    Intolerances        HOME MEDICATIONS: [x] Reviewed with patient at bedside    MEDICATIONS  (STANDING):  atorvastatin 20 milliGRAM(s) Oral at bedtime  docusate sodium 100 milliGRAM(s) Oral three times a day  lactated ringers. 1000 milliLiter(s) (75 mL/Hr) IV Continuous <Continuous>  multivitamin 1 Tablet(s) Oral daily  nicotine - 21 mG/24Hr(s) Patch 1 patch Transdermal daily  pantoprazole    Tablet 40 milliGRAM(s) Oral before breakfast  psyllium Powder 1 Packet(s) Oral daily  senna 2 Tablet(s) Oral at bedtime    MEDICATIONS  (PRN):  acetaminophen   Tablet 650 milliGRAM(s) Oral every 6 hours PRN For Temp greater than 38 C (100.4 F)  acetaminophen   Tablet. 650 milliGRAM(s) Oral every 6 hours PRN Mild Pain (1 - 3)  benzocaine 15 mG/menthol 3.6 mG Lozenge 1 Lozenge Oral every 3 hours PRN Sore Throat  calcium carbonate 500 mG (Tums) Chewable 1 Tablet(s) Chew three times a day PRN Dyspepsia  diazepam    Tablet 5 milliGRAM(s) Oral every 12 hours PRN Spasm  HYDROmorphone  Injectable 0.5 milliGRAM(s) IV Push every 4 hours PRN Breakthrough pain  magnesium hydroxide Suspension 30 milliLiter(s) Oral every 12 hours PRN Constipation  ondansetron Injectable 4 milliGRAM(s) IV Push every 6 hours PRN Nausea  oxyCODONE    IR 5 milliGRAM(s) Oral every 4 hours PRN Moderate Pain (4 - 6)  oxyCODONE    IR 10 milliGRAM(s) Oral every 4 hours PRN Severe Pain (7 - 10)      PAST MEDICAL & SURGICAL HISTORY:  Smoking history  Diverticulitis: last flare up &gt; 3 years ago  Hypercholesterolemia  Lumbar disc herniation  No Past Medical History  Incisional hernia: s/p repair  S/P appendectomy  S/P Colectomy: sigmoid resection  S/P Hysterectomy  [x ] Reviewed     SOCIAL HISTORY:  [x] Substance abuse: Denies  [x] Alcohol use: Drinks 1-2 drinks weekly. CAGE neg  [x] Tobacco: Current smoker. Smokes 1 pack per day. 44 pack year smoking history    FAMILY HISTORY:  Family history of breast cancer (Sibling)    REVIEW OF SYSTEMS:  [x] All other ROS negative  [  ] Unable to obtain due to poor mental status    Vital Signs Last 24 Hrs  T(C): 36.7 (20 Jun 2018 08:40), Max: 37.3 (19 Jun 2018 10:16)  T(F): 98.1 (20 Jun 2018 08:40), Max: 99.1 (19 Jun 2018 10:16)  HR: 88 (20 Jun 2018 08:40) (64 - 91)  BP: 104/48 (20 Jun 2018 08:40) (102/57 - 148/57)  BP(mean): 73 (19 Jun 2018 15:00) (73 - 83)  RR: 16 (20 Jun 2018 08:40) (12 - 18)  SpO2: 96% (20 Jun 2018 08:40) (93% - 100%)    PHYSICAL EXAM:  GENERAL: NAD, well-groomed, well-developed  HEENT: NCAT, Moist mucous membranes  NECK: Supple, No JVD  RESPIRATORY: Clear to auscultation bilaterally  CARDIOVASCULAR: S1/ S2, Regular rate and rhythm  GASTROINTESTINAL: Soft, Nontender, Nondistended; Bowel sounds present  MSK: lumbar dressing c/d/i, +HV w/ serosanguinous drainge  EXTREMITIES:  2+ Peripheral Pulses, No clubbing, cyanosis, or edema  NERVOUS SYSTEM:  A&Ox3, moving extremities purposefully,       LABS: reviewed                        13.8   13.37 )-----------( 283      ( 19 Jun 2018 16:00 )             41.3     Hemoglobin: 13.8 g/dL (06-19 @ 16:00)    06-20    139  |  101  |  13  ----------------------------<  112<H>  4.1   |  26  |  0.58    Ca    8.7      20 Jun 2018 06:00    Microbiology     RADIOLOGY & ADDITIONAL STUDIES:    EKG:   Personally Reviewed:  [x] YES NSR@74bpm, nl axes, nl intervals no STT abnormalities    Imaging:   Personally Reviewed:  [x] YES                 [ ] Consultant(s) Notes Reviewed  [x] Care Discussed with Consultants/Other Providers: Ortho PA - discussed pt's nausea CHIEF COMPLAINT: Patient is a 72y old  Female who presents with a chief complaint of lumbar lami (19 Jun 2018 18:30)      HPI: 73 yo woman current smoker PMH HLD chronic lower back pain for 2 years found to have a herniated disc now presented for scheduled L5-S1 Lumbar Laminectomy 6/20/18. Pt tolerated the surgery well. She had a headache and severe nausea this am which resolved by time of my evaluation at 11:30am. She reports she had severe pain in her back 8/10 at time of my evaluation and that she needed pain medicine. Reports pain medicine brings pain down to 1/10 and makes it more tolerable.  Reports nerve pains radiating down her leg improved post-op. Only has mild neuropathic pain in L foot.   She reports she felt lightheaded when she tried to stand up to work w/ PT so was unable to do much. Thinks she is feeling better as she is starting to eat. Urinating well. Passing gas    Pain Symptoms if applicable:             	                         none	   mild         moderate         severe  	                            0	    1-3	     4-6	         7-10  Pain: 8  Location:	back  Modifying factors:	movement  Associated symptoms:	    Allergies    sulfamethoxazole (Nausea)    Intolerances        HOME MEDICATIONS: [x] Reviewed with patient at bedside    MEDICATIONS  (STANDING):  atorvastatin 20 milliGRAM(s) Oral at bedtime  docusate sodium 100 milliGRAM(s) Oral three times a day  lactated ringers. 1000 milliLiter(s) (75 mL/Hr) IV Continuous <Continuous>  multivitamin 1 Tablet(s) Oral daily  nicotine - 21 mG/24Hr(s) Patch 1 patch Transdermal daily  pantoprazole    Tablet 40 milliGRAM(s) Oral before breakfast  psyllium Powder 1 Packet(s) Oral daily  senna 2 Tablet(s) Oral at bedtime    MEDICATIONS  (PRN):  acetaminophen   Tablet 650 milliGRAM(s) Oral every 6 hours PRN For Temp greater than 38 C (100.4 F)  acetaminophen   Tablet. 650 milliGRAM(s) Oral every 6 hours PRN Mild Pain (1 - 3)  benzocaine 15 mG/menthol 3.6 mG Lozenge 1 Lozenge Oral every 3 hours PRN Sore Throat  calcium carbonate 500 mG (Tums) Chewable 1 Tablet(s) Chew three times a day PRN Dyspepsia  diazepam    Tablet 5 milliGRAM(s) Oral every 12 hours PRN Spasm  HYDROmorphone  Injectable 0.5 milliGRAM(s) IV Push every 4 hours PRN Breakthrough pain  magnesium hydroxide Suspension 30 milliLiter(s) Oral every 12 hours PRN Constipation  ondansetron Injectable 4 milliGRAM(s) IV Push every 6 hours PRN Nausea  oxyCODONE    IR 5 milliGRAM(s) Oral every 4 hours PRN Moderate Pain (4 - 6)  oxyCODONE    IR 10 milliGRAM(s) Oral every 4 hours PRN Severe Pain (7 - 10)      PAST MEDICAL & SURGICAL HISTORY:  Smoking history  Diverticulitis: last flare up &gt; 3 years ago  Hypercholesterolemia  Lumbar disc herniation  No Past Medical History  Incisional hernia: s/p repair  S/P appendectomy  S/P Colectomy: sigmoid resection  S/P Hysterectomy  [x ] Reviewed     SOCIAL HISTORY:  [x] Substance abuse: Denies  [x] Alcohol use: Drinks 1-2 drinks weekly. CAGE neg  [x] Tobacco: Current smoker. Smokes 1 pack per day. 44 pack year smoking history    FAMILY HISTORY:  Family history of breast cancer (Sibling)    REVIEW OF SYSTEMS:  [x] All other ROS negative  [  ] Unable to obtain due to poor mental status    Vital Signs Last 24 Hrs  T(C): 36.7 (20 Jun 2018 08:40), Max: 37.3 (19 Jun 2018 10:16)  T(F): 98.1 (20 Jun 2018 08:40), Max: 99.1 (19 Jun 2018 10:16)  HR: 88 (20 Jun 2018 08:40) (64 - 91)  BP: 104/48 (20 Jun 2018 08:40) (102/57 - 148/57)  BP(mean): 73 (19 Jun 2018 15:00) (73 - 83)  RR: 16 (20 Jun 2018 08:40) (12 - 18)  SpO2: 96% (20 Jun 2018 08:40) (93% - 100%)    PHYSICAL EXAM:  GENERAL: NAD, well-groomed, well-developed  HEENT: NCAT, Moist mucous membranes  NECK: Supple, No JVD  RESPIRATORY: Clear to auscultation bilaterally  CARDIOVASCULAR: S1/ S2, Regular rate and rhythm  GASTROINTESTINAL: Soft, Nontender, Nondistended; Bowel sounds present  MSK: lumbar dressing c/d/i, +HV w/ serosanguinous drainge  EXTREMITIES:  2+ Peripheral Pulses, No clubbing, cyanosis, or edema  NERVOUS SYSTEM:  A&Ox3, moving extremities purposefully,       LABS: reviewed                        13.8   13.37 )-----------( 283      ( 19 Jun 2018 16:00 )             41.3     Hemoglobin: 13.8 g/dL (06-19 @ 16:00)    06-20    139  |  101  |  13  ----------------------------<  112<H>  4.1   |  26  |  0.58    Ca    8.7      20 Jun 2018 06:00    Microbiology     RADIOLOGY & ADDITIONAL STUDIES:    EKG:   Personally Reviewed:  [x] YES NSR@74bpm, nl axes, nl intervals no STT abnormalities    Imaging:   Personally Reviewed:  [x] YES   Xray Lumbar Spine              [ ] Consultant(s) Notes Reviewed  [x] Care Discussed with Consultants/Other Providers: Ortho PA - discussed pt's nausea CHIEF COMPLAINT: Patient is a 72y old  Female who presents with a chief complaint of lumbar lami (19 Jun 2018 18:30)      HPI: 71 yo woman current smoker PMH HLD chronic lower back pain for 2 years found to have a herniated disc now presented for scheduled L5-S1 Lumbar Laminectomy 6/20/18. Pt tolerated the surgery well. She had a headache and severe nausea this am which resolved by time of my evaluation at 11:30am. She reports she had severe pain in her back 8/10 at time of my evaluation and that she needed pain medicine. Reports pain medicine brings pain down to 1/10 and makes it more tolerable.  Reports nerve pains radiating down her leg improved post-op. Only has mild neuropathic pain in L foot.   She reports she felt lightheaded when she tried to stand up to work w/ PT so was unable to do much. Thinks she is feeling better as she is starting to eat. Urinating well. Passing gas    Pain Symptoms if applicable:             	                         none	   mild         moderate         severe  	                            0	    1-3	     4-6	         7-10  Pain: 8  Location:	back  Modifying factors:	movement  Associated symptoms:	    Allergies    sulfamethoxazole (Nausea)    Intolerances        HOME MEDICATIONS: [x] Reviewed with patient at bedside    MEDICATIONS  (STANDING):  atorvastatin 20 milliGRAM(s) Oral at bedtime  docusate sodium 100 milliGRAM(s) Oral three times a day  lactated ringers. 1000 milliLiter(s) (75 mL/Hr) IV Continuous <Continuous>  multivitamin 1 Tablet(s) Oral daily  nicotine - 21 mG/24Hr(s) Patch 1 patch Transdermal daily  pantoprazole    Tablet 40 milliGRAM(s) Oral before breakfast  psyllium Powder 1 Packet(s) Oral daily  senna 2 Tablet(s) Oral at bedtime    MEDICATIONS  (PRN):  acetaminophen   Tablet 650 milliGRAM(s) Oral every 6 hours PRN For Temp greater than 38 C (100.4 F)  acetaminophen   Tablet. 650 milliGRAM(s) Oral every 6 hours PRN Mild Pain (1 - 3)  benzocaine 15 mG/menthol 3.6 mG Lozenge 1 Lozenge Oral every 3 hours PRN Sore Throat  calcium carbonate 500 mG (Tums) Chewable 1 Tablet(s) Chew three times a day PRN Dyspepsia  diazepam    Tablet 5 milliGRAM(s) Oral every 12 hours PRN Spasm  HYDROmorphone  Injectable 0.5 milliGRAM(s) IV Push every 4 hours PRN Breakthrough pain  magnesium hydroxide Suspension 30 milliLiter(s) Oral every 12 hours PRN Constipation  ondansetron Injectable 4 milliGRAM(s) IV Push every 6 hours PRN Nausea  oxyCODONE    IR 5 milliGRAM(s) Oral every 4 hours PRN Moderate Pain (4 - 6)  oxyCODONE    IR 10 milliGRAM(s) Oral every 4 hours PRN Severe Pain (7 - 10)      PAST MEDICAL & SURGICAL HISTORY:  Smoking history  Diverticulitis: last flare up &gt; 3 years ago  Hypercholesterolemia  Lumbar disc herniation  No Past Medical History  Incisional hernia: s/p repair  S/P appendectomy  S/P Colectomy: sigmoid resection  S/P Hysterectomy  [x ] Reviewed     SOCIAL HISTORY:  [x] Substance abuse: Denies  [x] Alcohol use: Drinks 1-2 drinks weekly. CAGE neg  [x] Tobacco: Current smoker. Smokes 1 pack per day. 44 pack year smoking history    FAMILY HISTORY:  Family history of breast cancer (Sibling)    REVIEW OF SYSTEMS:  [x] All other ROS negative  [  ] Unable to obtain due to poor mental status    Vital Signs Last 24 Hrs  T(C): 36.7 (20 Jun 2018 08:40), Max: 37.3 (19 Jun 2018 10:16)  T(F): 98.1 (20 Jun 2018 08:40), Max: 99.1 (19 Jun 2018 10:16)  HR: 88 (20 Jun 2018 08:40) (64 - 91)  BP: 104/48 (20 Jun 2018 08:40) (102/57 - 148/57)  BP(mean): 73 (19 Jun 2018 15:00) (73 - 83)  RR: 16 (20 Jun 2018 08:40) (12 - 18)  SpO2: 96% (20 Jun 2018 08:40) (93% - 100%)    PHYSICAL EXAM:  GENERAL: NAD, well-groomed, well-developed  HEENT: NCAT, Moist mucous membranes  NECK: Supple, No JVD  RESPIRATORY: Clear to auscultation bilaterally  CARDIOVASCULAR: S1/ S2, Regular rate and rhythm  GASTROINTESTINAL: Soft, Nontender, Nondistended; Bowel sounds present  MSK: lumbar dressing c/d/i, +HV w/ serosanguinous drainge  EXTREMITIES:  2+ Peripheral Pulses, No clubbing, cyanosis, or edema  NERVOUS SYSTEM:  A&Ox3, moving extremities purposefully,       LABS: reviewed                        13.8   13.37 )-----------( 283      ( 19 Jun 2018 16:00 )             41.3     Hemoglobin: 13.8 g/dL (06-19 @ 16:00)    06-20    139  |  101  |  13  ----------------------------<  112<H>  4.1   |  26  |  0.58    Ca    8.7      20 Jun 2018 06:00    Microbiology     RADIOLOGY & ADDITIONAL STUDIES:    EKG:   Personally Reviewed:  [x] YES NSR@74bpm, nl axes, nl intervals no STT abnormalities    Imaging:   Personally Reviewed:  [x] YES   Xray Lumbar Spine              [ ] Consultant(s) Notes Reviewed  [x] Care Discussed with Consultants/Other Providers: Ortho PA - discussed pt's nausea, starting high dose nicotine patch

## 2018-06-20 NOTE — DISCHARGE NOTE ADULT - DURABLE MEDICAL EQUIPMENT AGENCY
[facility] [employer] - [phone]  The above company delivered a rolling walker to the patient in hospital on 6/22/18

## 2018-06-20 NOTE — DISCHARGE NOTE ADULT - HOSPITAL COURSE
Patient s/p L5-S1 laminectomy/discectomy on 6/19/18. Patient tolerated the procedure well without any intraoperative complications. Patient tolerated physical therapy well and pain is controlled. Seen by medical attending for continuity of care and management and cleared for safe discharge. As per surgeon patient stable and ready for discharge.     Please follow up with Dr. Braxton  in 1-2 weeks. Call office to make an appointment. Please follow up with your PMD for continuity of care and management as medications may have changed. Do not take any NSAIDS (motrin, advil, ibuprofren, aleve), Aspirin, Antiinflammatory medications unless instructed by your orthopaedic surgeon to continue. Avoid any heavy lifting, bending, squatting, twisting motion. Keep dressing/incision clean, dry, intact. Any suture/staples to be removed on post op day # 14 at office visit. Weight bear as tolerated.

## 2018-06-20 NOTE — CONSULT NOTE ADULT - PROBLEM SELECTOR RECOMMENDATION 5
Pt thinking about quitting not ready to quit  Counseled on smoking cessation   Nicotine patch  21 mG/24Hr(s) Patch today

## 2018-06-20 NOTE — DISCHARGE NOTE ADULT - CONDITIONS AT DISCHARGE
stable, lumbar dressing in place clean dry and intact, tolerating diet, voiding well, oob with assist stable, lumbar dressing in place clean dry and intact, tolerating diet, voiding well, oob with assist lumbar support brace on

## 2018-06-20 NOTE — DISCHARGE NOTE ADULT - CARE PLAN
Principal Discharge DX:	Lumbar disc herniation  Goal:	pain control, surgical site healing, ambulation, return to ADL's  Assessment and plan of treatment:	Patient s/p L5-S1 laminectomy/discectomy on 6/19/18. Patient tolerated the procedure well without any intraoperative complications. Patient tolerated physical therapy well and pain is controlled. Seen by medical attending for continuity of care and management and cleared for safe discharge. As per surgeon patient stable and ready for discharge.     Please follow up with Dr. Braxton  in 1-2 weeks. Call office to make an appointment. Please follow up with your PMD for continuity of care and management as medications may have changed. Do not take any NSAIDS (motrin, advil, ibuprofren, aleve), Aspirin, Antiinflammatory medications unless instructed by your orthopaedic surgeon to continue. Avoid any heavy lifting, bending, squatting, twisting motion. Keep dressing/incision clean, dry, intact. Any suture/staples to be removed on post op day # 14 at office visit. Weight bear as tolerated.

## 2018-06-20 NOTE — CONSULT NOTE ADULT - PROBLEM SELECTOR RECOMMENDATION 4
Pt thinking about quitting not ready to quit   on smoking cessation   Nicotine patch Pt thinking about quitting not ready to quit   on smoking cessation   Nicotine patch  21 mG/24Hr(s) Patch today Continue lipitor

## 2018-06-20 NOTE — CONSULT NOTE ADULT - PROBLEM SELECTOR RECOMMENDATION 2
Likely reactive post op  Monitor for signs of infection Likely 2/2 anesthesia and pain meds  Continue Zofran prn

## 2018-06-20 NOTE — DISCHARGE NOTE ADULT - PLAN OF CARE
pain control, surgical site healing, ambulation, return to ADL's Patient s/p L5-S1 laminectomy/discectomy on 6/19/18. Patient tolerated the procedure well without any intraoperative complications. Patient tolerated physical therapy well and pain is controlled. Seen by medical attending for continuity of care and management and cleared for safe discharge. As per surgeon patient stable and ready for discharge.     Please follow up with Dr. Braxton  in 1-2 weeks. Call office to make an appointment. Please follow up with your PMD for continuity of care and management as medications may have changed. Do not take any NSAIDS (motrin, advil, ibuprofren, aleve), Aspirin, Antiinflammatory medications unless instructed by your orthopaedic surgeon to continue. Avoid any heavy lifting, bending, squatting, twisting motion. Keep dressing/incision clean, dry, intact. Any suture/staples to be removed on post op day # 14 at office visit. Weight bear as tolerated.

## 2018-06-20 NOTE — PROGRESS NOTE ADULT - ASSESSMENT
72F POD1 s/p L5/S1 lami/disc  1. Pain control  2. Venodynes  3. WBAT/PT/OT/OOB  4. AM labs  5. Cont drain  6. Dispo planning  7. D/w Dr. Fox Saunders MD 72F POD1 s/p L5/S1 lami/disc  1. Pain control  2. Venodynes  3. WBAT/PT/OT/OOB  4. AM labs  5. Cont drain  6. Dispo planning  7. D/w Dr. Fox Saunders MD  Patient seen and examined, I discussed and agree with the above, patient has complete resolution of pre-operative leg pain, Dr. Shiva Braxton

## 2018-06-20 NOTE — DISCHARGE NOTE ADULT - PATIENT PORTAL LINK FT
You can access the [facility], offered by [facility], by registering with the following website: [email or web address]

## 2018-06-20 NOTE — CONSULT NOTE ADULT - PROBLEM SELECTOR RECOMMENDATION 9
s/p L5-S1 laminectomy POD#1  Continue pain control with current regimen  PT/OT  Bowel regimen  Encourage incentive spirometry s/p L5-S1 laminectomy POD#1  Continue pain control with current regimen. Pt given prn dose of pain meds, was last given at 5:30am  PT/OT  Bowel regimen  Encourage incentive spirometry

## 2018-06-21 LAB
BASOPHILS # BLD AUTO: 0.03 K/UL — SIGNIFICANT CHANGE UP (ref 0–0.2)
BASOPHILS NFR BLD AUTO: 0.2 % — SIGNIFICANT CHANGE UP (ref 0–2)
BUN SERPL-MCNC: 12 MG/DL — SIGNIFICANT CHANGE UP (ref 7–23)
CALCIUM SERPL-MCNC: 8.5 MG/DL — SIGNIFICANT CHANGE UP (ref 8.4–10.5)
CHLORIDE SERPL-SCNC: 101 MMOL/L — SIGNIFICANT CHANGE UP (ref 98–107)
CO2 SERPL-SCNC: 27 MMOL/L — SIGNIFICANT CHANGE UP (ref 22–31)
CREAT SERPL-MCNC: 0.68 MG/DL — SIGNIFICANT CHANGE UP (ref 0.5–1.3)
EOSINOPHIL # BLD AUTO: 0.01 K/UL — SIGNIFICANT CHANGE UP (ref 0–0.5)
EOSINOPHIL NFR BLD AUTO: 0.1 % — SIGNIFICANT CHANGE UP (ref 0–6)
GLUCOSE SERPL-MCNC: 104 MG/DL — HIGH (ref 70–99)
HCT VFR BLD CALC: 37.8 % — SIGNIFICANT CHANGE UP (ref 34.5–45)
HGB BLD-MCNC: 12.9 G/DL — SIGNIFICANT CHANGE UP (ref 11.5–15.5)
IMM GRANULOCYTES # BLD AUTO: 0.06 # — SIGNIFICANT CHANGE UP
IMM GRANULOCYTES NFR BLD AUTO: 0.5 % — SIGNIFICANT CHANGE UP (ref 0–1.5)
LYMPHOCYTES # BLD AUTO: 1.93 K/UL — SIGNIFICANT CHANGE UP (ref 1–3.3)
LYMPHOCYTES # BLD AUTO: 15.1 % — SIGNIFICANT CHANGE UP (ref 13–44)
MCHC RBC-ENTMCNC: 31.2 PG — SIGNIFICANT CHANGE UP (ref 27–34)
MCHC RBC-ENTMCNC: 34.1 % — SIGNIFICANT CHANGE UP (ref 32–36)
MCV RBC AUTO: 91.5 FL — SIGNIFICANT CHANGE UP (ref 80–100)
MONOCYTES # BLD AUTO: 0.99 K/UL — HIGH (ref 0–0.9)
MONOCYTES NFR BLD AUTO: 7.7 % — SIGNIFICANT CHANGE UP (ref 2–14)
NEUTROPHILS # BLD AUTO: 9.77 K/UL — HIGH (ref 1.8–7.4)
NEUTROPHILS NFR BLD AUTO: 76.4 % — SIGNIFICANT CHANGE UP (ref 43–77)
NRBC # FLD: 0 — SIGNIFICANT CHANGE UP
PLATELET # BLD AUTO: 240 K/UL — SIGNIFICANT CHANGE UP (ref 150–400)
PMV BLD: 10.5 FL — SIGNIFICANT CHANGE UP (ref 7–13)
POTASSIUM SERPL-MCNC: 4.2 MMOL/L — SIGNIFICANT CHANGE UP (ref 3.5–5.3)
POTASSIUM SERPL-SCNC: 4.2 MMOL/L — SIGNIFICANT CHANGE UP (ref 3.5–5.3)
RBC # BLD: 4.13 M/UL — SIGNIFICANT CHANGE UP (ref 3.8–5.2)
RBC # FLD: 13.2 % — SIGNIFICANT CHANGE UP (ref 10.3–14.5)
SODIUM SERPL-SCNC: 139 MMOL/L — SIGNIFICANT CHANGE UP (ref 135–145)
WBC # BLD: 12.79 K/UL — HIGH (ref 3.8–10.5)
WBC # FLD AUTO: 12.79 K/UL — HIGH (ref 3.8–10.5)

## 2018-06-21 PROCEDURE — 99233 SBSQ HOSP IP/OBS HIGH 50: CPT

## 2018-06-21 RX ORDER — HYDROMORPHONE HYDROCHLORIDE 2 MG/ML
4 INJECTION INTRAMUSCULAR; INTRAVENOUS; SUBCUTANEOUS EVERY 4 HOURS
Qty: 0 | Refills: 0 | Status: DISCONTINUED | OUTPATIENT
Start: 2018-06-21 | End: 2018-06-22

## 2018-06-21 RX ORDER — ACETAMINOPHEN 500 MG
650 TABLET ORAL EVERY 6 HOURS
Qty: 0 | Refills: 0 | Status: DISCONTINUED | OUTPATIENT
Start: 2018-06-21 | End: 2018-06-22

## 2018-06-21 RX ORDER — HYDROMORPHONE HYDROCHLORIDE 2 MG/ML
2 INJECTION INTRAMUSCULAR; INTRAVENOUS; SUBCUTANEOUS EVERY 4 HOURS
Qty: 0 | Refills: 0 | Status: DISCONTINUED | OUTPATIENT
Start: 2018-06-21 | End: 2018-06-22

## 2018-06-21 RX ADMIN — Medication 100 MILLIGRAM(S): at 13:29

## 2018-06-21 RX ADMIN — HYDROMORPHONE HYDROCHLORIDE 0.5 MILLIGRAM(S): 2 INJECTION INTRAMUSCULAR; INTRAVENOUS; SUBCUTANEOUS at 06:44

## 2018-06-21 RX ADMIN — HYDROMORPHONE HYDROCHLORIDE 0.5 MILLIGRAM(S): 2 INJECTION INTRAMUSCULAR; INTRAVENOUS; SUBCUTANEOUS at 07:00

## 2018-06-21 RX ADMIN — HYDROMORPHONE HYDROCHLORIDE 4 MILLIGRAM(S): 2 INJECTION INTRAMUSCULAR; INTRAVENOUS; SUBCUTANEOUS at 11:50

## 2018-06-21 RX ADMIN — Medication 1 PATCH: at 12:10

## 2018-06-21 RX ADMIN — HYDROMORPHONE HYDROCHLORIDE 2 MILLIGRAM(S): 2 INJECTION INTRAMUSCULAR; INTRAVENOUS; SUBCUTANEOUS at 18:24

## 2018-06-21 RX ADMIN — PANTOPRAZOLE SODIUM 40 MILLIGRAM(S): 20 TABLET, DELAYED RELEASE ORAL at 06:44

## 2018-06-21 RX ADMIN — HYDROMORPHONE HYDROCHLORIDE 4 MILLIGRAM(S): 2 INJECTION INTRAMUSCULAR; INTRAVENOUS; SUBCUTANEOUS at 04:21

## 2018-06-21 RX ADMIN — Medication 5 MILLIGRAM(S): at 00:37

## 2018-06-21 RX ADMIN — Medication 1 TABLET(S): at 12:10

## 2018-06-21 RX ADMIN — HYDROMORPHONE HYDROCHLORIDE 4 MILLIGRAM(S): 2 INJECTION INTRAMUSCULAR; INTRAVENOUS; SUBCUTANEOUS at 10:51

## 2018-06-21 RX ADMIN — Medication 650 MILLIGRAM(S): at 12:10

## 2018-06-21 RX ADMIN — Medication 100 MILLIGRAM(S): at 06:44

## 2018-06-21 RX ADMIN — SENNA PLUS 2 TABLET(S): 8.6 TABLET ORAL at 21:33

## 2018-06-21 RX ADMIN — Medication 650 MILLIGRAM(S): at 18:24

## 2018-06-21 RX ADMIN — ATORVASTATIN CALCIUM 20 MILLIGRAM(S): 80 TABLET, FILM COATED ORAL at 21:33

## 2018-06-21 RX ADMIN — HYDROMORPHONE HYDROCHLORIDE 4 MILLIGRAM(S): 2 INJECTION INTRAMUSCULAR; INTRAVENOUS; SUBCUTANEOUS at 05:11

## 2018-06-21 RX ADMIN — Medication 100 MILLIGRAM(S): at 21:33

## 2018-06-21 RX ADMIN — Medication 1 PATCH: at 12:11

## 2018-06-21 NOTE — PROGRESS NOTE ADULT - ASSESSMENT
71 yo woman current smoker PMH HLD chronic lower back pain for 2 years found to have a herniated disc now s/p scheduled L5-S1 Lumbar Laminectomy 6/20/18, POD#2

## 2018-06-21 NOTE — PROGRESS NOTE ADULT - ASSESSMENT
72F POD2 s/p L5/S1 lami/disc  1. Pain control  2. Venodynes  3. WBAT/PT/OT/OOB  4. AM labs  5. Dispo planning  6. D/w Dr. Fox Saunders MD 72F POD2 s/p L5/S1 lami/disc  1. Pain control  2. Venodynes  3. WBAT/PT/OT/OOB  4. AM labs  5. Dispo planning  6. D/w Dr. Fox Saunders MD  Patient seen and examined on rounds today. No leg pain. I agree and discussed with the above, Dr. Shiva Braxton.

## 2018-06-22 VITALS
RESPIRATION RATE: 18 BRPM | TEMPERATURE: 98 F | OXYGEN SATURATION: 100 % | DIASTOLIC BLOOD PRESSURE: 66 MMHG | SYSTOLIC BLOOD PRESSURE: 105 MMHG | HEART RATE: 98 BPM

## 2018-06-22 LAB
BASOPHILS # BLD AUTO: 0.03 K/UL — SIGNIFICANT CHANGE UP (ref 0–0.2)
BASOPHILS NFR BLD AUTO: 0.3 % — SIGNIFICANT CHANGE UP (ref 0–2)
BUN SERPL-MCNC: 10 MG/DL — SIGNIFICANT CHANGE UP (ref 7–23)
CALCIUM SERPL-MCNC: 8.6 MG/DL — SIGNIFICANT CHANGE UP (ref 8.4–10.5)
CHLORIDE SERPL-SCNC: 101 MMOL/L — SIGNIFICANT CHANGE UP (ref 98–107)
CO2 SERPL-SCNC: 25 MMOL/L — SIGNIFICANT CHANGE UP (ref 22–31)
CREAT SERPL-MCNC: 0.6 MG/DL — SIGNIFICANT CHANGE UP (ref 0.5–1.3)
EOSINOPHIL # BLD AUTO: 0.05 K/UL — SIGNIFICANT CHANGE UP (ref 0–0.5)
EOSINOPHIL NFR BLD AUTO: 0.4 % — SIGNIFICANT CHANGE UP (ref 0–6)
GLUCOSE SERPL-MCNC: 150 MG/DL — HIGH (ref 70–99)
HCT VFR BLD CALC: 36.8 % — SIGNIFICANT CHANGE UP (ref 34.5–45)
HGB BLD-MCNC: 12.5 G/DL — SIGNIFICANT CHANGE UP (ref 11.5–15.5)
IMM GRANULOCYTES # BLD AUTO: 0.06 # — SIGNIFICANT CHANGE UP
IMM GRANULOCYTES NFR BLD AUTO: 0.5 % — SIGNIFICANT CHANGE UP (ref 0–1.5)
LYMPHOCYTES # BLD AUTO: 18.4 % — SIGNIFICANT CHANGE UP (ref 13–44)
LYMPHOCYTES # BLD AUTO: 2.06 K/UL — SIGNIFICANT CHANGE UP (ref 1–3.3)
MCHC RBC-ENTMCNC: 31 PG — SIGNIFICANT CHANGE UP (ref 27–34)
MCHC RBC-ENTMCNC: 34 % — SIGNIFICANT CHANGE UP (ref 32–36)
MCV RBC AUTO: 91.3 FL — SIGNIFICANT CHANGE UP (ref 80–100)
MONOCYTES # BLD AUTO: 0.84 K/UL — SIGNIFICANT CHANGE UP (ref 0–0.9)
MONOCYTES NFR BLD AUTO: 7.5 % — SIGNIFICANT CHANGE UP (ref 2–14)
NEUTROPHILS # BLD AUTO: 8.15 K/UL — HIGH (ref 1.8–7.4)
NEUTROPHILS NFR BLD AUTO: 72.9 % — SIGNIFICANT CHANGE UP (ref 43–77)
NRBC # FLD: 0 — SIGNIFICANT CHANGE UP
PLATELET # BLD AUTO: 225 K/UL — SIGNIFICANT CHANGE UP (ref 150–400)
PMV BLD: 10 FL — SIGNIFICANT CHANGE UP (ref 7–13)
POTASSIUM SERPL-MCNC: 3.9 MMOL/L — SIGNIFICANT CHANGE UP (ref 3.5–5.3)
POTASSIUM SERPL-SCNC: 3.9 MMOL/L — SIGNIFICANT CHANGE UP (ref 3.5–5.3)
RBC # BLD: 4.03 M/UL — SIGNIFICANT CHANGE UP (ref 3.8–5.2)
RBC # FLD: 13.2 % — SIGNIFICANT CHANGE UP (ref 10.3–14.5)
SODIUM SERPL-SCNC: 138 MMOL/L — SIGNIFICANT CHANGE UP (ref 135–145)
WBC # BLD: 11.19 K/UL — HIGH (ref 3.8–10.5)
WBC # FLD AUTO: 11.19 K/UL — HIGH (ref 3.8–10.5)

## 2018-06-22 PROCEDURE — 99233 SBSQ HOSP IP/OBS HIGH 50: CPT

## 2018-06-22 RX ORDER — HYDROMORPHONE HYDROCHLORIDE 2 MG/ML
2 INJECTION INTRAMUSCULAR; INTRAVENOUS; SUBCUTANEOUS
Qty: 56 | Refills: 0
Start: 2018-06-22 | End: 2018-06-28

## 2018-06-22 RX ORDER — DIAZEPAM 5 MG
1 TABLET ORAL
Qty: 10 | Refills: 0
Start: 2018-06-22 | End: 2018-06-26

## 2018-06-22 RX ORDER — DOCUSATE SODIUM 100 MG
1 CAPSULE ORAL
Qty: 0 | Refills: 0 | DISCHARGE
Start: 2018-06-22

## 2018-06-22 RX ORDER — PANTOPRAZOLE SODIUM 20 MG/1
1 TABLET, DELAYED RELEASE ORAL
Qty: 0 | Refills: 0 | DISCHARGE
Start: 2018-06-22

## 2018-06-22 RX ORDER — ACETAMINOPHEN 500 MG
0 TABLET ORAL
Qty: 0 | Refills: 0 | COMMUNITY

## 2018-06-22 RX ORDER — DICLOFENAC SODIUM 75 MG/1
1 TABLET, DELAYED RELEASE ORAL
Qty: 0 | Refills: 0 | COMMUNITY

## 2018-06-22 RX ORDER — SENNA PLUS 8.6 MG/1
2 TABLET ORAL
Qty: 0 | Refills: 0 | DISCHARGE
Start: 2018-06-22

## 2018-06-22 RX ADMIN — Medication 100 MILLIGRAM(S): at 05:11

## 2018-06-22 RX ADMIN — HYDROMORPHONE HYDROCHLORIDE 4 MILLIGRAM(S): 2 INJECTION INTRAMUSCULAR; INTRAVENOUS; SUBCUTANEOUS at 03:28

## 2018-06-22 RX ADMIN — Medication 650 MILLIGRAM(S): at 05:11

## 2018-06-22 RX ADMIN — HYDROMORPHONE HYDROCHLORIDE 4 MILLIGRAM(S): 2 INJECTION INTRAMUSCULAR; INTRAVENOUS; SUBCUTANEOUS at 02:31

## 2018-06-22 RX ADMIN — HYDROMORPHONE HYDROCHLORIDE 2 MILLIGRAM(S): 2 INJECTION INTRAMUSCULAR; INTRAVENOUS; SUBCUTANEOUS at 10:21

## 2018-06-22 RX ADMIN — PANTOPRAZOLE SODIUM 40 MILLIGRAM(S): 20 TABLET, DELAYED RELEASE ORAL at 07:10

## 2018-06-22 RX ADMIN — HYDROMORPHONE HYDROCHLORIDE 2 MILLIGRAM(S): 2 INJECTION INTRAMUSCULAR; INTRAVENOUS; SUBCUTANEOUS at 11:20

## 2018-06-22 RX ADMIN — Medication 5 MILLIGRAM(S): at 04:34

## 2018-06-22 NOTE — PROGRESS NOTE ADULT - PROBLEM SELECTOR PLAN 5
Daily nicotine patch  Counseled on smoking cessation
Daily nicotine patch  Counseled on smoking cessation

## 2018-06-22 NOTE — PROGRESS NOTE ADULT - ASSESSMENT
71 yo woman current smoker PMH HLD chronic lower back pain for 2 years found to have a herniated disc now s/p scheduled L5-S1 Lumbar Laminectomy 6/20/18, POD#3

## 2018-06-22 NOTE — PROGRESS NOTE ADULT - ASSESSMENT
72F POD3 s/p L5/S1 lami/disc  1. Pain control  2. Venodynes  3. WBAT/PT/OT/OOB  4. AM labs  5. Dispo planning - home today  6. D/w Dr. Fox Saunders MD 72F POD3 s/p L5/S1 lami/disc  1. Pain control  2. Venodynes  3. WBAT/PT/OT/OOB  4. AM labs  5. Dispo planning - home today  6. D/w Dr. Fox Saunders MD  Patient was seen and examined on rounds today, I agree and I discussed with the above, Dr. Shiva Braxton.

## 2018-06-22 NOTE — PROGRESS NOTE ADULT - PROBLEM SELECTOR PLAN 1
s/p /p L5-S1 laminectomy POD#3  Continue pain control with current regimen.   PT/OT  Bowel regimen  Encourage incentive spirometry
s/p /p L5-S1 laminectomy POD#2  Continue pain control with current regimen.   PT/OT  Bowel regimen  Encourage incentive spirometry

## 2018-06-22 NOTE — PROGRESS NOTE ADULT - PROBLEM SELECTOR PLAN 2
Improved. Was likely 2/2 anesthesia and medication   Continue zofran prn
Improved. Was likely 2/2 anesthesia and medication   Continue zofran prn

## 2018-06-22 NOTE — PROGRESS NOTE ADULT - SUBJECTIVE AND OBJECTIVE BOX
Ortho Spine Post Op Check    Patient seen and examined, pain controlled, no actue event.  Denies any chest pain, SOB, N/V/D, HA or dizziness.  Patient has no complaints.      Vital Signs Last 24 Hrs  T(C): 36.5 (06-19-18 @ 14:25), Max: 37.3 (06-19-18 @ 10:16)  T(F): 97.7 (06-19-18 @ 14:25), Max: 99.1 (06-19-18 @ 10:16)  HR: 77 (06-19-18 @ 16:30) (64 - 77)  BP: 122/61 (06-19-18 @ 16:30) (102/63 - 148/57)  BP(mean): 73 (06-19-18 @ 15:00) (73 - 83)  RR: 16 (06-19-18 @ 16:30) (12 - 17)  SpO2: 94% (06-19-18 @ 16:30) (94% - 100%)    Physical Exam  Gen: ALert, NAD  Back: Dressing C/D/I, +HV  B/L LE: Extremity warm, brisk cap refill, compartments soft, +2 DP pulse, +4/5 EHL/FHl/GS/TA but poor effort secondary to LBP, SILT s/s/dp/sp/t                          13.8   13.37 )-----------( 283      ( 19 Jun 2018 16:00 )             41.3     19 Jun 2018 16:00    138    |  101    |  15     ----------------------------<  141    3.9     |  25     |  0.68     Ca    8.5        19 Jun 2018 16:00        A/P:  71 yo female s/p L5-S1 lami/disc  Pain control  Venodynes  PT/OT  WBAT  FU labs  Incentive spirometry
CHIEF COMPLAINT: f/u orthopedic co-management    SUBJECTIVE / OVERNIGHT EVENTS: Patient seen and examined. No acute events overnight. She reports she walked well w/ PT including the stairs. Pain was 10/10 at time of evaluation and she was requesting pain medication. She reports she was sitting in the chair for some time. Eating well w/o nausea/vomiting. Had mild headache.     MEDICATIONS  (STANDING): reviewed  acetaminophen   Tablet. 650 milliGRAM(s) Oral every 6 hours  atorvastatin 20 milliGRAM(s) Oral at bedtime  docusate sodium 100 milliGRAM(s) Oral three times a day  lactated ringers. 1000 milliLiter(s) (75 mL/Hr) IV Continuous <Continuous>  multivitamin 1 Tablet(s) Oral daily  nicotine - 21 mG/24Hr(s) Patch 1 patch Transdermal daily  pantoprazole    Tablet 40 milliGRAM(s) Oral before breakfast  psyllium Powder 1 Packet(s) Oral daily  senna 2 Tablet(s) Oral at bedtime    MEDICATIONS  (PRN):  benzocaine 15 mG/menthol 3.6 mG Lozenge 1 Lozenge Oral every 3 hours PRN Sore Throat  calcium carbonate 500 mG (Tums) Chewable 1 Tablet(s) Chew three times a day PRN Dyspepsia  diazepam    Tablet 5 milliGRAM(s) Oral every 12 hours PRN Spasm  HYDROmorphone   Tablet 2 milliGRAM(s) Oral every 4 hours PRN Mild and Moderate Pain  HYDROmorphone   Tablet 4 milliGRAM(s) Oral every 4 hours PRN Severe Pain (7 - 10)  HYDROmorphone  Injectable 0.5 milliGRAM(s) IV Push every 4 hours PRN Breakthrough pain  magnesium hydroxide Suspension 30 milliLiter(s) Oral every 12 hours PRN Constipation  ondansetron Injectable 4 milliGRAM(s) IV Push every 6 hours PRN Nausea      VITALS:  T(F): 98.8 (06-21-18 @ 09:52), Max: 99.6 (06-20-18 @ 17:49)  HR: 96 (06-21-18 @ 09:52) (87 - 99)  BP: 110/53 (06-21-18 @ 09:52) (95/38 - 130/57)  RR: 18 (06-21-18 @ 09:52) (16 - 18)  SpO2: 96% (06-21-18 @ 09:52)  Wt(kg): --      CAPILLARY BLOOD GLUCOSE    PHYSICAL EXAM:  GENERAL: NAD, well-developed  HEENT: EOMI, conjunctiva and sclera clear  NECK: Supple, No JVD  CHEST/LUNG: Clear to auscultation bilaterally  HEART: Regular rate and rhythm; No murmurs, rubs, or gallops  ABDOMEN: Soft, Nontender, Nondistended; Bowel sounds present  MSK: lumbar dressing c/d/i  EXTREMITIES:  2+ Peripheral Pulses, No clubbing, cyanosis, or edema  PSYCH: AAOx3  NEUROLOGY: ambulating, moving extremities purposefully    LABS: reviewed              12.9                 139  | 27   | 12           12.79 >-----------< 240     ------------------------< 104                   37.8                 4.2  | 101  | 0.68                                         Ca 8.5   Mg x     Ph x        RADIOLOGY & ADDITIONAL TESTS:  Imaging Personally Reviewed: [x] Yes    [ ] Consultant(s) Notes Reviewed:  [x] Care Discussed with Consultants/Other Providers: Orthopedic PA - discussed
Pt seen and examined; pain controlled; +nausea overnight; LLE pain resolved per patient    Vital Signs Last 24 Hrs  T(C): 37.1 (20 Jun 2018 05:01), Max: 37.3 (19 Jun 2018 10:16)  T(F): 98.7 (20 Jun 2018 05:01), Max: 99.1 (19 Jun 2018 10:16)  HR: 91 (20 Jun 2018 05:01) (64 - 91)  BP: 102/57 (20 Jun 2018 05:01) (102/57 - 148/57)  BP(mean): 73 (19 Jun 2018 15:00) (73 - 83)  RR: 16 (20 Jun 2018 05:01) (12 - 18)  SpO2: 94% (20 Jun 2018 05:01) (93% - 100%)                          13.8   13.37 )-----------( 283      ( 19 Jun 2018 16:00 )             41.3     06-19    138  |  101  |  15  ----------------------------<  141<H>  3.9   |  25  |  0.68    Ca    8.5      19 Jun 2018 16:00    NAD  Lumbar dressing c/d/i  HVx1 in place with good seal  5/5 IP/Q/H/TA/GS/EHL/FHL  +paresthesia dorsolateral aspect of L foot, SILT BLLE  WWP
Pt seen and examined; pain controlled; c/o LBP, minimal LLE pain that is improved from preop; L foot sensation improving per patient     Vital Signs Last 24 Hrs  T(C): 36.8 (21 Jun 2018 06:42), Max: 37.6 (20 Jun 2018 17:49)  T(F): 98.2 (21 Jun 2018 06:42), Max: 99.6 (20 Jun 2018 17:49)  HR: 99 (21 Jun 2018 06:42) (87 - 99)  BP: 118/50 (21 Jun 2018 06:42) (95/38 - 130/57)  BP(mean): --  RR: 17 (21 Jun 2018 06:42) (16 - 17)  SpO2: 93% (21 Jun 2018 06:42) (93% - 96%)                          13.2   17.94 )-----------( 240      ( 20 Jun 2018 10:00 )             38.5     06-20    143  |  103  |  13  ----------------------------<  120<H>  4.5   |  28  |  0.59    Ca    8.6      20 Jun 2018 10:00    NAD  Lumbar dressing changed, incision c/d/i  HV removed, tip intact  5/5 IP/Q/H/TA/GS/EHL/FHL  SILT BLLE, L foot sensation improved   WWP
Pt seen and examined; pain controlled; no acute overnight events    Vital Signs Last 24 Hrs  T(C): 37.4 (22 Jun 2018 05:10), Max: 37.4 (22 Jun 2018 05:10)  T(F): 99.4 (22 Jun 2018 05:10), Max: 99.4 (22 Jun 2018 05:10)  HR: 96 (22 Jun 2018 05:10) (85 - 98)  BP: 113/52 (22 Jun 2018 05:10) (99/49 - 118/60)  BP(mean): --  RR: 15 (22 Jun 2018 05:10) (15 - 18)  SpO2: 92% (22 Jun 2018 05:10) (92% - 97%)                          12.9   12.79 )-----------( 240      ( 21 Jun 2018 05:16 )             37.8     06-21    139  |  101  |  12  ----------------------------<  104<H>  4.2   |  27  |  0.68    Ca    8.5      21 Jun 2018 05:16      NAD  Lumbar dressing c/d/i  5/5 IP/Q/H/TA/GS/EHL/FHL  JAMIR GARZA
CHIEF COMPLAINT: f/u orthopedic co-management    SUBJECTIVE / OVERNIGHT EVENTS: Patient seen and examined. No acute events overnight. Pain well controlled and patient without any complaints. Ambulating well but complaining the brace is uncomfortable. Reports she is eating well. Passing gas. Drinking prune juice as she hasn't had a BM    MEDICATIONS  (STANDING): reviewed  acetaminophen   Tablet. 650 milliGRAM(s) Oral every 6 hours  atorvastatin 20 milliGRAM(s) Oral at bedtime  docusate sodium 100 milliGRAM(s) Oral three times a day  lactated ringers. 1000 milliLiter(s) (75 mL/Hr) IV Continuous <Continuous>  multivitamin 1 Tablet(s) Oral daily  nicotine - 21 mG/24Hr(s) Patch 1 patch Transdermal daily  pantoprazole    Tablet 40 milliGRAM(s) Oral before breakfast  psyllium Powder 1 Packet(s) Oral daily  senna 2 Tablet(s) Oral at bedtime    MEDICATIONS  (PRN):  benzocaine 15 mG/menthol 3.6 mG Lozenge 1 Lozenge Oral every 3 hours PRN Sore Throat  calcium carbonate 500 mG (Tums) Chewable 1 Tablet(s) Chew three times a day PRN Dyspepsia  diazepam    Tablet 5 milliGRAM(s) Oral every 12 hours PRN Spasm  HYDROmorphone   Tablet 2 milliGRAM(s) Oral every 4 hours PRN Mild and Moderate Pain  HYDROmorphone   Tablet 4 milliGRAM(s) Oral every 4 hours PRN Severe Pain (7 - 10)  HYDROmorphone  Injectable 0.5 milliGRAM(s) IV Push every 4 hours PRN Breakthrough pain  magnesium hydroxide Suspension 30 milliLiter(s) Oral every 12 hours PRN Constipation  ondansetron Injectable 4 milliGRAM(s) IV Push every 6 hours PRN Nausea      VITALS:  T(F): 98.1 (06-22-18 @ 09:07), Max: 99.4 (06-22-18 @ 05:10)  HR: 98 (06-22-18 @ 09:07) (85 - 98)  BP: 105/66 (06-22-18 @ 09:07) (105/66 - 118/60)  RR: 18 (06-22-18 @ 09:07) (15 - 18)  SpO2: 100% (06-22-18 @ 09:07)  Wt(kg): --      CAPILLARY BLOOD GLUCOSE    PHYSICAL EXAM:  GENERAL: NAD, well-developed  HEAD:  Atraumatic, Normocephalic  EYES: EOMI, conjunctiva and sclera clear  NECK: Supple, No JVD  CHEST/LUNG: Clear to auscultation bilaterally  HEART: S1/ S2,  Regular rate and rhythm  ABDOMEN: Soft, Nontender, Nondistended; Bowel sounds present  MSK: lumbar dressing c/d/i  EXTREMITIES:  2+ Peripheral Pulses, No clubbing, cyanosis, or edema  PSYCH: AAOx3      LABS: reviewed              12.5                 138  | 25   | 10           11.19 >-----------< 225     ------------------------< 150                   36.8                 3.9  | 101  | 0.60                                         Ca 8.6   Mg x     Ph x                      RADIOLOGY & ADDITIONAL TESTS:  Imaging Personally Reviewed: [x] Yes    [ ] Consultant(s) Notes Reviewed:  [x] Care Discussed with Consultants/Other Providers: Orthopedic PA - discussed

## 2018-06-25 LAB — SURGICAL PATHOLOGY STUDY: SIGNIFICANT CHANGE UP

## 2018-06-27 RX ORDER — TRAMADOL HYDROCHLORIDE 50 MG/1
50 TABLET, COATED ORAL
Qty: 40 | Refills: 0 | Status: ACTIVE | COMMUNITY
Start: 2018-06-27 | End: 1900-01-01

## 2018-06-27 RX ORDER — DIAZEPAM 5 MG/1
5 TABLET ORAL
Qty: 20 | Refills: 0 | Status: ACTIVE | COMMUNITY
Start: 2018-06-27 | End: 1900-01-01

## 2018-06-28 ENCOUNTER — RX RENEWAL (OUTPATIENT)
Age: 73
End: 2018-06-28

## 2018-06-28 RX ORDER — PREDNISONE 10 MG/1
10 TABLET ORAL
Qty: 30 | Refills: 0 | Status: ACTIVE | COMMUNITY
Start: 2018-06-28 | End: 1900-01-01

## 2018-07-09 ENCOUNTER — APPOINTMENT (OUTPATIENT)
Dept: ORTHOPEDIC SURGERY | Facility: CLINIC | Age: 73
End: 2018-07-09
Payer: COMMERCIAL

## 2018-07-09 VITALS — SYSTOLIC BLOOD PRESSURE: 152 MMHG | DIASTOLIC BLOOD PRESSURE: 70 MMHG | HEART RATE: 50 BPM

## 2018-07-09 PROCEDURE — 99024 POSTOP FOLLOW-UP VISIT: CPT

## 2018-07-09 PROCEDURE — 72100 X-RAY EXAM L-S SPINE 2/3 VWS: CPT

## 2018-07-09 RX ORDER — ACETAMINOPHEN AND CODEINE 300; 30 MG/1; MG/1
300-30 TABLET ORAL 3 TIMES DAILY
Qty: 40 | Refills: 0 | Status: COMPLETED | COMMUNITY
Start: 2018-07-09 | End: 2018-07-09

## 2018-07-09 RX ORDER — ACETAMINOPHEN AND CODEINE 300; 30 MG/1; MG/1
300-30 TABLET ORAL EVERY 8 HOURS
Qty: 9 | Refills: 0 | Status: ACTIVE | OUTPATIENT
Start: 2018-07-09

## 2018-07-10 RX ORDER — ACETAMINOPHEN AND CODEINE 300; 30 MG/1; MG/1
300-30 TABLET ORAL 3 TIMES DAILY
Qty: 40 | Refills: 0 | Status: ACTIVE | COMMUNITY
Start: 2018-07-10 | End: 1900-01-01

## 2018-07-25 RX ORDER — TIZANIDINE 4 MG/1
4 TABLET ORAL EVERY 8 HOURS
Qty: 60 | Refills: 0 | Status: DISCONTINUED | COMMUNITY
Start: 2018-07-17 | End: 2018-07-25

## 2018-07-25 RX ORDER — METHOCARBAMOL 750 MG/1
750 TABLET, FILM COATED ORAL 3 TIMES DAILY
Qty: 90 | Refills: 1 | Status: ACTIVE | COMMUNITY
Start: 2018-07-25 | End: 1900-01-01

## 2018-07-30 ENCOUNTER — APPOINTMENT (OUTPATIENT)
Dept: ORTHOPEDIC SURGERY | Facility: CLINIC | Age: 73
End: 2018-07-30
Payer: COMMERCIAL

## 2018-07-30 DIAGNOSIS — M54.5 LOW BACK PAIN: ICD-10-CM

## 2018-07-30 PROCEDURE — 99024 POSTOP FOLLOW-UP VISIT: CPT

## 2018-07-30 PROCEDURE — 72100 X-RAY EXAM L-S SPINE 2/3 VWS: CPT

## 2018-07-30 RX ORDER — CYCLOBENZAPRINE HYDROCHLORIDE 10 MG/1
10 TABLET, FILM COATED ORAL 3 TIMES DAILY
Qty: 90 | Refills: 1 | Status: ACTIVE | COMMUNITY
Start: 2018-07-30 | End: 1900-01-01

## 2018-07-30 RX ORDER — CYCLOBENZAPRINE HYDROCHLORIDE 10 MG/1
10 TABLET, FILM COATED ORAL 3 TIMES DAILY
Qty: 1 | Refills: 1 | Status: ACTIVE | COMMUNITY
Start: 2018-07-30 | End: 1900-01-01

## 2018-08-22 RX ORDER — CYCLOBENZAPRINE HYDROCHLORIDE 5 MG/1
5 TABLET, FILM COATED ORAL 3 TIMES DAILY
Qty: 30 | Refills: 1 | Status: DISCONTINUED | COMMUNITY
Start: 2018-07-30 | End: 2018-08-22

## 2018-08-22 RX ORDER — TIZANIDINE 4 MG/1
4 TABLET ORAL EVERY 8 HOURS
Qty: 90 | Refills: 1 | Status: ACTIVE | COMMUNITY
Start: 2018-08-22 | End: 1900-01-01

## 2018-09-10 NOTE — DISCHARGE NOTE ADULT - CARE PROVIDER_API CALL
Shiva Braxton (MD; DC), Orthopaedic Surgery  [address] [place] [address]  Phone: [phone]  Fax: [phone]
Detail Level: Zone
Plan:\\n-Continue with Tretinoin 0.05% topical cream nightly \\n-Continue Benzaclin Pump Topical Gel QAM\\n-Doxycycline 100mg BID. Called his father, discussed this with him, he would like to proceed.\\n-Discussed aggressive sunscreen \\n-RTC in 12 weeks

## 2018-09-17 ENCOUNTER — FORM ENCOUNTER (OUTPATIENT)
Age: 73
End: 2018-09-17

## 2019-02-07 PROBLEM — M51.26 OTHER INTERVERTEBRAL DISC DISPLACEMENT, LUMBAR REGION: Chronic | Status: ACTIVE | Noted: 2018-06-05

## 2019-02-07 PROBLEM — Z87.891 PERSONAL HISTORY OF NICOTINE DEPENDENCE: Chronic | Status: ACTIVE | Noted: 2018-06-05

## 2019-02-07 PROBLEM — E78.00 PURE HYPERCHOLESTEROLEMIA, UNSPECIFIED: Chronic | Status: ACTIVE | Noted: 2018-06-05

## 2019-02-07 PROBLEM — K57.92 DIVERTICULITIS OF INTESTINE, PART UNSPECIFIED, WITHOUT PERFORATION OR ABSCESS WITHOUT BLEEDING: Chronic | Status: ACTIVE | Noted: 2018-06-05

## 2019-02-20 ENCOUNTER — APPOINTMENT (OUTPATIENT)
Dept: CV DIAGNOSITCS | Facility: HOSPITAL | Age: 74
End: 2019-02-20
Payer: COMMERCIAL

## 2019-02-20 ENCOUNTER — OUTPATIENT (OUTPATIENT)
Dept: OUTPATIENT SERVICES | Facility: HOSPITAL | Age: 74
LOS: 1 days | End: 2019-02-20

## 2019-02-20 DIAGNOSIS — M79.606 PAIN IN LEG, UNSPECIFIED: ICD-10-CM

## 2019-02-20 DIAGNOSIS — K43.2 INCISIONAL HERNIA WITHOUT OBSTRUCTION OR GANGRENE: Chronic | ICD-10-CM

## 2019-02-20 DIAGNOSIS — Z90.49 ACQUIRED ABSENCE OF OTHER SPECIFIED PARTS OF DIGESTIVE TRACT: Chronic | ICD-10-CM

## 2019-02-20 PROCEDURE — 93923 UPR/LXTR ART STDY 3+ LVLS: CPT | Mod: 26

## 2022-03-09 ENCOUNTER — APPOINTMENT (OUTPATIENT)
Dept: ORTHOPEDIC SURGERY | Facility: CLINIC | Age: 77
End: 2022-03-09
Payer: MEDICARE

## 2022-03-09 VITALS — HEART RATE: 92 BPM | SYSTOLIC BLOOD PRESSURE: 149 MMHG | DIASTOLIC BLOOD PRESSURE: 78 MMHG | HEIGHT: 62 IN

## 2022-03-09 DIAGNOSIS — M54.50 LOW BACK PAIN, UNSPECIFIED: ICD-10-CM

## 2022-03-09 PROCEDURE — 99203 OFFICE O/P NEW LOW 30 MIN: CPT

## 2022-03-09 PROCEDURE — 72082 X-RAY EXAM ENTIRE SPI 2/3 VW: CPT

## 2022-03-09 NOTE — PHYSICAL EXAM
[Antalgic] : antalgic [de-identified] : Examination of the lumbar spine reveals no midline tenderness palpation, step-offs, or skin lesions.  Healed lumbar incision.  Decreased range of motion with respect to flexion, extension, lateral bending, and rotation. No tenderness to palpation of the sciatic notch. No tenderness palpation of the bilateral greater trochanters. No pain with passive internal/external rotation of the hips. No instability of bilateral lower extremities.  Negative LILY. Negative straight leg raise bilaterally. No bowstring. Negative femoral stretch. 5 out of 5 iliopsoas, hip abductors, hips adductors, quadriceps, hamstrings, gastrocsoleus, tibialis anterior, extensor hallucis longus, peroneals. Grossly intact sensation to light touch bilateral lower extremities. 1+ patellar and Achilles reflexes. Downgoing Babinski. No clonus. Intact proprioception. Palpable pulses. No skin lesion and no edema on the right and left lower extremities. [de-identified] : AP lateral scoliosis x-rays reveals upper thoracic kyphosis as well as cervical kyphosis.  Positive sagittal balance.  Instrumented L4-S1 fusion\par \par Review of her CT scan demonstrates prior L4-S1 fusion with interbody devices.  She appears to have an immature fusion.  No obvious hardware failure.

## 2022-03-09 NOTE — DISCUSSION/SUMMARY
[de-identified] : We discussed further treatment options both nonsurgical and surgical.  We reviewed her imaging.  She has very nonspecific back pain.  No radicular complaints.  From a surgical standpoint we have discussed possible removal of hardware versus revision fusion.  However, I have cautioned her that the results of this are unpredictable with regards to treating her pain.  I recommend further evaluation with pain management.  She will let me know of any changes or worsening of her symptoms.

## 2022-03-09 NOTE — HISTORY OF PRESENT ILLNESS
[de-identified] : Ms. YARITZA MYLES  is a 76 year old female who presents with intense low back pain for the past few years.  She had a lumbar fusion in 2020 but continues to have low back pain and left foot numbness.  She feels her posture is more pitched forward.  Her foot can also get an intense burning sensation and it is difficult to walk.  Denies any LE radicular symptoms.  Normal bowel and bladder control.   Denies any recent fevers, chills, sweats, weight loss, or infection.  She has been taking 800 mg of gababentin 3x/day and hydrocodone 7.5mg 3x/day. \par \par The patients past medical history, past surgical history, medications, allergies, and social history were reviewed by me today with the patient and documented accordingly.  In addition, the patient's family history, which is noncontributory to their visit, was also reviewed.\par

## 2022-09-19 ENCOUNTER — APPOINTMENT (OUTPATIENT)
Dept: ORTHOPEDIC SURGERY | Facility: CLINIC | Age: 77
End: 2022-09-19

## 2022-09-19 VITALS
DIASTOLIC BLOOD PRESSURE: 74 MMHG | HEART RATE: 76 BPM | WEIGHT: 115 LBS | BODY MASS INDEX: 21.16 KG/M2 | SYSTOLIC BLOOD PRESSURE: 123 MMHG | HEIGHT: 62 IN

## 2022-09-19 PROCEDURE — 99214 OFFICE O/P EST MOD 30 MIN: CPT

## 2022-09-19 NOTE — HISTORY OF PRESENT ILLNESS
[de-identified] : Patient is a 76 year-old female who presents to the office today for re-evaluation of intense low back pain for the past few years. She had a lumbar fusion in 2020 but continues to have low back pain and left foot numbness. She feels her posture is more pitched forward. Her foot can also get an intense burning sensation and it is difficult to walk.  Denies any LE radicular symptoms. Normal bowel and bladder control. Denies any recent fevers, chills, sweats, weight loss, or infection. She has been taking 800 mg of gababentin 3x/day and hydrocodone 7.5mg 3x/day. She has had no improvement since her last visit in March. Pain management has been continuing injections with no relief. Her last injection was in June.\par \par The patients past medical history, past surgical history, medications, allergies, and social history were reviewed by me today with the patient and documented accordingly.  In addition, the patient's family history, which is noncontributory to their visit, was also reviewed.\par

## 2022-09-19 NOTE — DISCUSSION/SUMMARY
[de-identified] : We discussed further treatment options.  Patient appears to be symptomatic from a pseudoarthrosis L4-S1.  We will obtain updated imaging of both the form of an MRI and CT scan for further evaluation.  She has been trying medications and injections without any relief.  Follow-up after her updated imaging studies.

## 2022-09-19 NOTE — PHYSICAL EXAM
[Antalgic] : antalgic [de-identified] : Examination of the lumbar spine reveals no midline tenderness palpation, step-offs, or skin lesions.  Healed lumbar incision.  Decreased range of motion with respect to flexion, extension, lateral bending, and rotation. No tenderness to palpation of the sciatic notch. No tenderness palpation of the bilateral greater trochanters. No pain with passive internal/external rotation of the hips. No instability of bilateral lower extremities.  Negative LILY. Negative straight leg raise bilaterally. No bowstring. Negative femoral stretch. 5 out of 5 iliopsoas, hip abductors, hips adductors, quadriceps, hamstrings, gastrocsoleus, tibialis anterior, extensor hallucis longus, peroneals. Grossly intact sensation to light touch bilateral lower extremities. 1+ patellar and Achilles reflexes. Downgoing Babinski. No clonus. Intact proprioception. Palpable pulses. No skin lesion and no edema on the right and left lower extremities. [de-identified] : AP lateral scoliosis x-rays reveals upper thoracic kyphosis as well as cervical kyphosis.  Positive sagittal balance.  Instrumented L4-S1 fusion\par \par Review of her CT scan demonstrates prior L4-S1 fusion with interbody devices.  She appears to have an immature fusion.  No obvious hardware failure.

## 2022-09-29 ENCOUNTER — APPOINTMENT (OUTPATIENT)
Dept: ORTHOPEDIC SURGERY | Facility: CLINIC | Age: 77
End: 2022-09-29

## 2022-09-29 DIAGNOSIS — M48.07 SPINAL STENOSIS, LUMBOSACRAL REGION: ICD-10-CM

## 2022-09-29 PROCEDURE — 99443: CPT | Mod: 95

## 2022-12-01 PROBLEM — M48.07 LUMBOSACRAL STENOSIS: Status: ACTIVE | Noted: 2018-02-26

## 2022-12-27 ENCOUNTER — OUTPATIENT (OUTPATIENT)
Dept: OUTPATIENT SERVICES | Facility: HOSPITAL | Age: 77
LOS: 1 days | End: 2022-12-27
Payer: MEDICARE

## 2022-12-27 VITALS
HEART RATE: 76 BPM | RESPIRATION RATE: 16 BRPM | TEMPERATURE: 98 F | DIASTOLIC BLOOD PRESSURE: 57 MMHG | SYSTOLIC BLOOD PRESSURE: 126 MMHG | OXYGEN SATURATION: 98 % | HEIGHT: 62 IN | WEIGHT: 100.97 LBS

## 2022-12-27 DIAGNOSIS — Z90.49 ACQUIRED ABSENCE OF OTHER SPECIFIED PARTS OF DIGESTIVE TRACT: Chronic | ICD-10-CM

## 2022-12-27 DIAGNOSIS — M54.16 RADICULOPATHY, LUMBAR REGION: ICD-10-CM

## 2022-12-27 DIAGNOSIS — Z01.818 ENCOUNTER FOR OTHER PREPROCEDURAL EXAMINATION: ICD-10-CM

## 2022-12-27 DIAGNOSIS — Z98.890 OTHER SPECIFIED POSTPROCEDURAL STATES: Chronic | ICD-10-CM

## 2022-12-27 DIAGNOSIS — K43.2 INCISIONAL HERNIA WITHOUT OBSTRUCTION OR GANGRENE: Chronic | ICD-10-CM

## 2022-12-27 LAB
A1C WITH ESTIMATED AVERAGE GLUCOSE RESULT: 5.8 % — HIGH (ref 4–5.6)
ALBUMIN SERPL ELPH-MCNC: 2.9 G/DL — LOW (ref 3.3–5)
ALP SERPL-CCNC: 85 U/L — SIGNIFICANT CHANGE UP (ref 40–120)
ALT FLD-CCNC: 10 U/L — LOW (ref 12–78)
ANION GAP SERPL CALC-SCNC: 8 MMOL/L — SIGNIFICANT CHANGE UP (ref 5–17)
AST SERPL-CCNC: 11 U/L — LOW (ref 15–37)
BILIRUB SERPL-MCNC: 0.2 MG/DL — SIGNIFICANT CHANGE UP (ref 0.2–1.2)
BUN SERPL-MCNC: 13 MG/DL — SIGNIFICANT CHANGE UP (ref 7–23)
CALCIUM SERPL-MCNC: 8.8 MG/DL — SIGNIFICANT CHANGE UP (ref 8.5–10.1)
CHLORIDE SERPL-SCNC: 109 MMOL/L — HIGH (ref 96–108)
CO2 SERPL-SCNC: 27 MMOL/L — SIGNIFICANT CHANGE UP (ref 22–31)
CREAT SERPL-MCNC: 0.61 MG/DL — SIGNIFICANT CHANGE UP (ref 0.5–1.3)
EGFR: 93 ML/MIN/1.73M2 — SIGNIFICANT CHANGE UP
ESTIMATED AVERAGE GLUCOSE: 120 MG/DL — HIGH (ref 68–114)
GLUCOSE SERPL-MCNC: 104 MG/DL — HIGH (ref 70–99)
HCT VFR BLD CALC: 35.2 % — SIGNIFICANT CHANGE UP (ref 34.5–45)
HGB BLD-MCNC: 11.6 G/DL — SIGNIFICANT CHANGE UP (ref 11.5–15.5)
MCHC RBC-ENTMCNC: 30.4 PG — SIGNIFICANT CHANGE UP (ref 27–34)
MCHC RBC-ENTMCNC: 33 GM/DL — SIGNIFICANT CHANGE UP (ref 32–36)
MCV RBC AUTO: 92.4 FL — SIGNIFICANT CHANGE UP (ref 80–100)
MRSA PCR RESULT.: SIGNIFICANT CHANGE UP
NRBC # BLD: 0 /100 WBCS — SIGNIFICANT CHANGE UP (ref 0–0)
PLATELET # BLD AUTO: 377 K/UL — SIGNIFICANT CHANGE UP (ref 150–400)
POTASSIUM SERPL-MCNC: 3.6 MMOL/L — SIGNIFICANT CHANGE UP (ref 3.5–5.3)
POTASSIUM SERPL-SCNC: 3.6 MMOL/L — SIGNIFICANT CHANGE UP (ref 3.5–5.3)
PROT SERPL-MCNC: 6.4 G/DL — SIGNIFICANT CHANGE UP (ref 6–8.3)
RBC # BLD: 3.81 M/UL — SIGNIFICANT CHANGE UP (ref 3.8–5.2)
RBC # FLD: 15.4 % — HIGH (ref 10.3–14.5)
S AUREUS DNA NOSE QL NAA+PROBE: SIGNIFICANT CHANGE UP
SODIUM SERPL-SCNC: 144 MMOL/L — SIGNIFICANT CHANGE UP (ref 135–145)
WBC # BLD: 10.78 K/UL — HIGH (ref 3.8–10.5)
WBC # FLD AUTO: 10.78 K/UL — HIGH (ref 3.8–10.5)

## 2022-12-27 PROCEDURE — 85027 COMPLETE CBC AUTOMATED: CPT

## 2022-12-27 PROCEDURE — 86850 RBC ANTIBODY SCREEN: CPT

## 2022-12-27 PROCEDURE — 36415 COLL VENOUS BLD VENIPUNCTURE: CPT

## 2022-12-27 PROCEDURE — 72110 X-RAY EXAM L-2 SPINE 4/>VWS: CPT

## 2022-12-27 PROCEDURE — 93010 ELECTROCARDIOGRAM REPORT: CPT

## 2022-12-27 PROCEDURE — 87640 STAPH A DNA AMP PROBE: CPT

## 2022-12-27 PROCEDURE — 93005 ELECTROCARDIOGRAM TRACING: CPT

## 2022-12-27 PROCEDURE — 86900 BLOOD TYPING SEROLOGIC ABO: CPT

## 2022-12-27 PROCEDURE — 80053 COMPREHEN METABOLIC PANEL: CPT

## 2022-12-27 PROCEDURE — 86901 BLOOD TYPING SEROLOGIC RH(D): CPT

## 2022-12-27 PROCEDURE — G0463: CPT

## 2022-12-27 PROCEDURE — 87641 MR-STAPH DNA AMP PROBE: CPT

## 2022-12-27 PROCEDURE — 83036 HEMOGLOBIN GLYCOSYLATED A1C: CPT

## 2022-12-27 PROCEDURE — 72110 X-RAY EXAM L-2 SPINE 4/>VWS: CPT | Mod: 26

## 2022-12-27 RX ORDER — ATORVASTATIN CALCIUM 80 MG/1
1 TABLET, FILM COATED ORAL
Qty: 0 | Refills: 0 | DISCHARGE

## 2022-12-27 RX ORDER — HYDROCODONE BITARTRATE AND ACETAMINOPHEN 7.5; 325 MG/15ML; MG/15ML
0 SOLUTION ORAL
Qty: 0 | Refills: 0 | DISCHARGE

## 2022-12-27 RX ORDER — CALCIUM CARBONATE 500(1250)
1 TABLET ORAL
Qty: 0 | Refills: 0 | DISCHARGE

## 2022-12-27 RX ORDER — ASCORBIC ACID 60 MG
1 TABLET,CHEWABLE ORAL
Qty: 0 | Refills: 0 | DISCHARGE

## 2022-12-27 NOTE — H&P PST ADULT - ASSESSMENT
scheduled for a L3-L4 laminectomy and diskectomy - L2 kyphoplasty - plastic closure on 1/5/2023 with Dr. Saleem

## 2022-12-27 NOTE — H&P PST ADULT - NSICDXPASTSURGICALHX_GEN_ALL_CORE_FT
PAST SURGICAL HISTORY:  Incisional hernia s/p repair    S/P appendectomy     S/P Colectomy sigmoid resection    S/P Hysterectomy      PAST SURGICAL HISTORY:  History of back surgery     Incisional hernia s/p repair    S/P appendectomy     S/P Colectomy sigmoid resection    S/P Hysterectomy

## 2022-12-27 NOTE — H&P PST ADULT - NSICDXFAMILYHX_GEN_ALL_CORE_FT
FAMILY HISTORY:  Sibling  Still living? Yes, Estimated age: Age Unknown  Family history of breast cancer, Age at diagnosis: Age Unknown

## 2022-12-27 NOTE — H&P PST ADULT - HISTORY OF PRESENT ILLNESS
Pt is a 72 y.o. female ; pt reports h/o chronic lower back pain first noted 2 years ago ; pt to surgeon ; a w/u was done " herniated disc" Pt now presents for Lumbar Laminectomy L5-S1      75 yo female presents to PST scheduled for a L3-L4 laminectomy and diskectomy - L2 kyphoplasty - plastic closure on 1/5/2023 with Dr. Saleem  75 yo female smoker with HLD and CBP secondary to spinal stenosis s/p back surgery in 2018 and 2020, with no relief, now presents to PST scheduled for a L3-L4 laminectomy and diskectomy - L2 kyphoplasty - plastic closure on 1/5/2023 with Dr. Saleem. Reports a recent fall at home, fracturing her vertebre.

## 2022-12-27 NOTE — H&P PST ADULT - NSICDXPASTMEDICALHX_GEN_ALL_CORE_FT
PAST MEDICAL HISTORY:  Diverticulitis last flare up > 3 years ago    Hypercholesterolemia     Lumbar disc herniation     Radiculopathy, lumbar region     Smoking history

## 2022-12-27 NOTE — H&P PST ADULT - PROBLEM SELECTOR PLAN 2
Labs-CBC, CMP, PT/PTT, T&S, A1c, Nose cx, and EKG, X-ray. COVID PCR 48-72 before DOS.   MC with Dr. Amrit Bowman  Pre op and Hibiclens instructions reviewed and given. Take routine am meds DOS with sip of water. Avoid NSAIDs and OTC supplements. Tylenol is ok. Verbalized understanding Labs-CBC, CMP, PT/PTT, T&S, A1c, Nose cx, and EKG, spine X-ray. COVID PCR 48-72 before DOS.   MC with Dr. Amrit Bowman  Pre op and Hibiclens instructions reviewed and given. No meds DOS.  Avoid NSAIDs and OTC supplements. Tylenol is ok. Verbalized understanding

## 2022-12-27 NOTE — H&P PST ADULT - NSANTHOSAYNRD_GEN_A_CORE
Denies VAZQUEZ/No. VAZQUEZ screening performed.  STOP BANG Legend: 0-2 = LOW Risk; 3-4 = INTERMEDIATE Risk; 5-8 = HIGH Risk

## 2022-12-30 PROBLEM — M54.16 RADICULOPATHY, LUMBAR REGION: Chronic | Status: ACTIVE | Noted: 2022-12-27

## 2023-01-03 ENCOUNTER — OUTPATIENT (OUTPATIENT)
Dept: OUTPATIENT SERVICES | Facility: HOSPITAL | Age: 78
LOS: 1 days | End: 2023-01-03
Payer: MEDICARE

## 2023-01-03 DIAGNOSIS — K43.2 INCISIONAL HERNIA WITHOUT OBSTRUCTION OR GANGRENE: Chronic | ICD-10-CM

## 2023-01-03 DIAGNOSIS — Z90.49 ACQUIRED ABSENCE OF OTHER SPECIFIED PARTS OF DIGESTIVE TRACT: Chronic | ICD-10-CM

## 2023-01-03 DIAGNOSIS — Z20.828 CONTACT WITH AND (SUSPECTED) EXPOSURE TO OTHER VIRAL COMMUNICABLE DISEASES: ICD-10-CM

## 2023-01-03 DIAGNOSIS — Z98.890 OTHER SPECIFIED POSTPROCEDURAL STATES: Chronic | ICD-10-CM

## 2023-01-03 PROCEDURE — U0005: CPT

## 2023-01-03 PROCEDURE — U0003: CPT

## 2023-01-04 ENCOUNTER — TRANSCRIPTION ENCOUNTER (OUTPATIENT)
Age: 78
End: 2023-01-04

## 2023-01-04 LAB — SARS-COV-2 RNA SPEC QL NAA+PROBE: SIGNIFICANT CHANGE UP

## 2023-01-04 NOTE — ASU PATIENT PROFILE, ADULT - FALL HARM RISK - HARM RISK INTERVENTIONS

## 2023-01-04 NOTE — ASU PATIENT PROFILE, ADULT - DOES PATIENT HAVE ADVANCE DIRECTIVE
Rhofade Pregnancy And Lactation Text: This medication has not been assigned a Pregnancy Risk Category. It is unknown if the medication is excreted in breast milk. Yes

## 2023-01-04 NOTE — ASU PATIENT PROFILE, ADULT - NSICDXPASTSURGICALHX_GEN_ALL_CORE_FT
PAST SURGICAL HISTORY:  History of back surgery     Incisional hernia s/p repair    S/P appendectomy     S/P Colectomy sigmoid resection    S/P Hysterectomy

## 2023-01-05 ENCOUNTER — OUTPATIENT (OUTPATIENT)
Dept: OUTPATIENT SERVICES | Facility: HOSPITAL | Age: 78
LOS: 1 days | End: 2023-01-05
Payer: MEDICARE

## 2023-01-05 ENCOUNTER — TRANSCRIPTION ENCOUNTER (OUTPATIENT)
Age: 78
End: 2023-01-05

## 2023-01-05 VITALS
HEART RATE: 89 BPM | OXYGEN SATURATION: 100 % | SYSTOLIC BLOOD PRESSURE: 122 MMHG | RESPIRATION RATE: 16 BRPM | DIASTOLIC BLOOD PRESSURE: 53 MMHG

## 2023-01-05 VITALS
TEMPERATURE: 98 F | HEIGHT: 62 IN | WEIGHT: 98.11 LBS | OXYGEN SATURATION: 96 % | HEART RATE: 84 BPM | RESPIRATION RATE: 20 BRPM | SYSTOLIC BLOOD PRESSURE: 136 MMHG | DIASTOLIC BLOOD PRESSURE: 67 MMHG

## 2023-01-05 DIAGNOSIS — Z98.890 OTHER SPECIFIED POSTPROCEDURAL STATES: Chronic | ICD-10-CM

## 2023-01-05 DIAGNOSIS — K43.2 INCISIONAL HERNIA WITHOUT OBSTRUCTION OR GANGRENE: Chronic | ICD-10-CM

## 2023-01-05 DIAGNOSIS — M54.16 RADICULOPATHY, LUMBAR REGION: ICD-10-CM

## 2023-01-05 DIAGNOSIS — Z90.49 ACQUIRED ABSENCE OF OTHER SPECIFIED PARTS OF DIGESTIVE TRACT: Chronic | ICD-10-CM

## 2023-01-05 PROCEDURE — C1062: CPT

## 2023-01-05 PROCEDURE — 15734 MUSCLE-SKIN GRAFT TRUNK: CPT | Mod: XP

## 2023-01-05 PROCEDURE — 88311 DECALCIFY TISSUE: CPT | Mod: 26

## 2023-01-05 PROCEDURE — 76000 FLUOROSCOPY <1 HR PHYS/QHP: CPT

## 2023-01-05 PROCEDURE — 22514 PERQ VERTEBRAL AUGMENTATION: CPT

## 2023-01-05 PROCEDURE — 88304 TISSUE EXAM BY PATHOLOGIST: CPT

## 2023-01-05 PROCEDURE — C1889: CPT

## 2023-01-05 PROCEDURE — 63047 LAM FACETEC & FORAMOT LUMBAR: CPT

## 2023-01-05 PROCEDURE — 88304 TISSUE EXAM BY PATHOLOGIST: CPT | Mod: 26

## 2023-01-05 PROCEDURE — C1713: CPT

## 2023-01-05 PROCEDURE — 88311 DECALCIFY TISSUE: CPT

## 2023-01-05 DEVICE — IMPLANTABLE DEVICE: Type: IMPLANTABLE DEVICE | Site: BACK, BACK, LOWER | Status: FUNCTIONAL

## 2023-01-05 DEVICE — KIT SYS AUTOFLEX W/ HV CEMENT: Type: IMPLANTABLE DEVICE | Site: BACK, BACK, LOWER | Status: FUNCTIONAL

## 2023-01-05 DEVICE — SURGIFLO MATRIX WITH THROMBIN KIT: Type: IMPLANTABLE DEVICE | Site: BACK, BACK, LOWER | Status: FUNCTIONAL

## 2023-01-05 RX ORDER — CYCLOBENZAPRINE HYDROCHLORIDE 10 MG/1
1 TABLET, FILM COATED ORAL
Qty: 15 | Refills: 0
Start: 2023-01-05 | End: 2023-01-09

## 2023-01-05 RX ORDER — OXYCODONE AND ACETAMINOPHEN 5; 325 MG/1; MG/1
1 TABLET ORAL
Qty: 20 | Refills: 0
Start: 2023-01-05 | End: 2023-01-09

## 2023-01-05 RX ORDER — METOCLOPRAMIDE HCL 10 MG
10 TABLET ORAL ONCE
Refills: 0 | Status: COMPLETED | OUTPATIENT
Start: 2023-01-05 | End: 2023-01-05

## 2023-01-05 RX ORDER — SODIUM CHLORIDE 9 MG/ML
1000 INJECTION, SOLUTION INTRAVENOUS
Refills: 0 | Status: DISCONTINUED | OUTPATIENT
Start: 2023-01-05 | End: 2023-01-05

## 2023-01-05 RX ORDER — HYDROCODONE BITARTRATE AND ACETAMINOPHEN 7.5; 325 MG/15ML; MG/15ML
0 SOLUTION ORAL
Qty: 0 | Refills: 0 | DISCHARGE

## 2023-01-05 RX ORDER — APREPITANT 80 MG/1
40 CAPSULE ORAL ONCE
Refills: 0 | Status: COMPLETED | OUTPATIENT
Start: 2023-01-05 | End: 2023-01-05

## 2023-01-05 RX ORDER — BUPIVACAINE 13.3 MG/ML
20 INJECTION, SUSPENSION, LIPOSOMAL INFILTRATION ONCE
Refills: 0 | Status: DISCONTINUED | OUTPATIENT
Start: 2023-01-05 | End: 2023-01-05

## 2023-01-05 RX ORDER — ONDANSETRON 8 MG/1
4 TABLET, FILM COATED ORAL ONCE
Refills: 0 | Status: COMPLETED | OUTPATIENT
Start: 2023-01-05 | End: 2023-01-05

## 2023-01-05 RX ORDER — HYDROMORPHONE HYDROCHLORIDE 2 MG/ML
0.5 INJECTION INTRAMUSCULAR; INTRAVENOUS; SUBCUTANEOUS
Refills: 0 | Status: DISCONTINUED | OUTPATIENT
Start: 2023-01-05 | End: 2023-01-05

## 2023-01-05 RX ORDER — HYDROMORPHONE HYDROCHLORIDE 2 MG/ML
1 INJECTION INTRAMUSCULAR; INTRAVENOUS; SUBCUTANEOUS
Refills: 0 | Status: DISCONTINUED | OUTPATIENT
Start: 2023-01-05 | End: 2023-01-05

## 2023-01-05 RX ORDER — IBUPROFEN 200 MG
1 TABLET ORAL
Qty: 0 | Refills: 0 | DISCHARGE

## 2023-01-05 RX ORDER — PSYLLIUM SEED (WITH DEXTROSE)
0 POWDER (GRAM) ORAL
Qty: 0 | Refills: 0 | DISCHARGE

## 2023-01-05 RX ORDER — CEFAZOLIN SODIUM 1 G
2000 VIAL (EA) INJECTION ONCE
Refills: 0 | Status: DISCONTINUED | OUTPATIENT
Start: 2023-01-05 | End: 2023-01-05

## 2023-01-05 RX ORDER — ACETAMINOPHEN 500 MG
2 TABLET ORAL
Qty: 0 | Refills: 0 | DISCHARGE

## 2023-01-05 RX ORDER — CEFAZOLIN SODIUM 1 G
2000 VIAL (EA) INJECTION ONCE
Refills: 0 | Status: COMPLETED | OUTPATIENT
Start: 2023-01-05 | End: 2023-01-05

## 2023-01-05 RX ORDER — ATORVASTATIN CALCIUM 80 MG/1
1 TABLET, FILM COATED ORAL
Qty: 0 | Refills: 0 | DISCHARGE

## 2023-01-05 RX ORDER — BUPIVACAINE 13.3 MG/ML
20 INJECTION, SUSPENSION, LIPOSOMAL INFILTRATION ONCE
Refills: 0 | Status: COMPLETED | OUTPATIENT
Start: 2023-01-05 | End: 2023-01-05

## 2023-01-05 RX ADMIN — ONDANSETRON 4 MILLIGRAM(S): 8 TABLET, FILM COATED ORAL at 10:29

## 2023-01-05 RX ADMIN — SODIUM CHLORIDE 75 MILLILITER(S): 9 INJECTION, SOLUTION INTRAVENOUS at 10:27

## 2023-01-05 RX ADMIN — HYDROMORPHONE HYDROCHLORIDE 0.5 MILLIGRAM(S): 2 INJECTION INTRAMUSCULAR; INTRAVENOUS; SUBCUTANEOUS at 11:32

## 2023-01-05 RX ADMIN — HYDROMORPHONE HYDROCHLORIDE 0.5 MILLIGRAM(S): 2 INJECTION INTRAMUSCULAR; INTRAVENOUS; SUBCUTANEOUS at 10:41

## 2023-01-05 RX ADMIN — HYDROMORPHONE HYDROCHLORIDE 0.5 MILLIGRAM(S): 2 INJECTION INTRAMUSCULAR; INTRAVENOUS; SUBCUTANEOUS at 10:26

## 2023-01-05 RX ADMIN — SODIUM CHLORIDE 50 MILLILITER(S): 9 INJECTION, SOLUTION INTRAVENOUS at 07:45

## 2023-01-05 RX ADMIN — HYDROMORPHONE HYDROCHLORIDE 0.5 MILLIGRAM(S): 2 INJECTION INTRAMUSCULAR; INTRAVENOUS; SUBCUTANEOUS at 11:17

## 2023-01-05 RX ADMIN — APREPITANT 40 MILLIGRAM(S): 80 CAPSULE ORAL at 07:46

## 2023-01-05 RX ADMIN — Medication 10 MILLIGRAM(S): at 12:00

## 2023-01-05 NOTE — ASU DISCHARGE PLAN (ADULT/PEDIATRIC) - CARE PROVIDER_API CALL
Reji Saleem)  Orthopaedic Surgery  [address] [place] [address]  Phone: [phone]  Fax: [phone]  Follow Up Time: 2 weeks

## 2023-01-05 NOTE — BRIEF OPERATIVE NOTE - NSICDXBRIEFPROCEDURE_GEN_ALL_CORE_FT
PROCEDURES:  Lumbar laminectomy 05-Jan-2023 10:57:22 L3-4 laminectomy discectomy Gabriela Evans  Lumbar kyphoplasty 05-Jan-2023 10:57:39 L2 Gabriela Evans

## 2023-01-05 NOTE — BRIEF OPERATIVE NOTE - NSICDXBRIEFPREOP_GEN_ALL_CORE_FT
PRE-OP DIAGNOSIS:  Radiculitis due to herniation of intervertebral disc of lumbar spine 05-Jan-2023 10:56:33  Gabriela Evans

## 2023-01-05 NOTE — BRIEF OPERATIVE NOTE - NSICDXBRIEFPOSTOP_GEN_ALL_CORE_FT
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

## 2023-01-05 NOTE — ASU DISCHARGE PLAN (ADULT/PEDIATRIC) - NS MD DC FALL RISK RISK
For information on Fall & Injury Prevention, visit: [email or web address] OR  [email or web address] OR  https://www.cdc.gov/steadi/patient.html

## 2024-01-31 ENCOUNTER — APPOINTMENT (OUTPATIENT)
Dept: ORTHOPEDIC SURGERY | Facility: CLINIC | Age: 79
End: 2024-01-31
Payer: MEDICARE

## 2024-01-31 VITALS — HEIGHT: 60 IN | BODY MASS INDEX: 21.6 KG/M2 | WEIGHT: 110 LBS

## 2024-01-31 DIAGNOSIS — Z87.39 PERSONAL HISTORY OF OTHER DISEASES OF THE MUSCULOSKELETAL SYSTEM AND CONNECTIVE TISSUE: ICD-10-CM

## 2024-01-31 DIAGNOSIS — M40.209 UNSPECIFIED KYPHOSIS, SITE UNSPECIFIED: ICD-10-CM

## 2024-01-31 DIAGNOSIS — M54.16 RADICULOPATHY, LUMBAR REGION: ICD-10-CM

## 2024-01-31 PROCEDURE — 72110 X-RAY EXAM L-2 SPINE 4/>VWS: CPT

## 2024-01-31 PROCEDURE — 99204 OFFICE O/P NEW MOD 45 MIN: CPT

## 2024-01-31 NOTE — HISTORY OF PRESENT ILLNESS
[Lower back] : lower back [Left Leg] : left leg [Dull/Aching] : dull/aching [Localized] : localized [Sharp] : sharp [Shooting] : shooting [Tightness] : tightness [Constant] : constant [de-identified] : 01/31/2024: 78  yr old female c/o lower back pain that has been treated from another ortho drAmy Denies specific injury/trauma. States she had gotten Sx, CSI, Epidurals, and had participated in PT in the past. Initial surgery in 2020 with Dr. Braxton for lumbar laminectomy. HAD persistent pain and radiculopathy. Went revision lami fusion with L4-5 and L5-S1 TLIF with Dr. Barnhart in 2022 per patient.  Has had persistent back pain and radiculopathy since.  Radiates down left posterior thigh and posterior calf.  Has had multiple rounds of injections before and after surgery. Currently on opiates, muscle relaxants, and gabapentin. Had VCF as well and complains of pitched forward posture. s/p kyphoplasty.  DIfficlty standing upright and for extended periods of time  Current smoker.   [] : no

## 2024-01-31 NOTE — IMAGING
[de-identified] : Spine: Inspection/Palpation: No tenderness to palpation throughout Cervical/thoracic/lumbar spine. No bony stepoffs, No lesions.  Gait: antalgic, forward pitched posture    Neurologic:  Bilateral Lower Extremities 5/5 Iliopsoas/Quadriceps/Hamstrings/ Tibialis Anterior/ Gastrocnemius. Extensor Hallucis Longus/ Flexor Hallucis Longus except    Sensation intact to light touch L2-S1  X-ray Ap/Lateral/Flexion/Extension of lumbar spine were viewed and interpreted.  Normal alignment is maintained without any spondylolisthesis.  S/p L4-S1 fusion wth L4-5 adn L5-S1 tlif. Hardware in good position on x-ray  CT L spine from [facility] 2022 pseudoarthrosis.   MRI L spine from 2023  [facility] 1.  Interval surgical repair of the previously seen L2 compression fracture with associated T2 hypointense focus. Similar 25-30% vertebral body height loss with retropulsion. 2.  Interval decompression laminectomy at L3-L4 with associated postsurgical changes in the midline posterior seroma as described. No evidence of complication with the thecal sac. 3.  Undulation at the inferior endplate of L1 which may be related to previously seen bone marrow edema. 4.  Status post decompression laminectomy and posterior fusion of L4-L5 and L5-S1 without MRI evidence of perihardware fracture. 5.  Postsurgical scarring and a seroma in the L4-L5 and L5-S1 laminectomy region is similar to prior examination. No discrete communication with the thecal sac. 6.  Interbody graft placement at L4-L5 and L5-S1. 7.  Interval progression of discovertebral degeneration at L2-L3 and L3-L4 with progressive foraminal stenosis particularly at left L3-L4 with impingement of the exiting L3 nerve root. 8.  Otherwise, discovertebral degeneration at lower thoracic spine, L1-L2, L4-5 and L5-S1 is unchanged since prior MRI. 9.  Additional findings as described in detail in the body of the report.

## 2024-01-31 NOTE — ASSESSMENT
[FreeTextEntry1] : 78F with persistent LBP kyphosis and RLE radiculopathy Discussed at her age and smoking status revision lumbar or deformity surgery not na option Discussed continued medication use Discussed SCS but patient not interested FU with original surgeon as previously planned FU PRN

## 2024-08-09 NOTE — ASU PATIENT PROFILE, ADULT - PT NEEDS ASSIST
"Patient in recliner, resp even and unlabored, alert and oriented, O2 at 1.5L via NC, denies any pain/discomfort at this time, monitoring intake and output, patient is aware of fluid restriction, and IV lasix, per patient "this is effective", positive pedal pulse noted, call light within reach, recliner in lock position, BLE elevated with pillow.         Problem: Pneumonia  Goal: Fluid Balance  Outcome: Progressing  Intervention: Monitor and Manage Fluid Balance  Flowsheets (Taken 8/9/2024 1538)  Fluid/Electrolyte Management: fluids restricted     Problem: Fall Injury Risk  Goal: Absence of Fall and Fall-Related Injury  Outcome: Progressing  Intervention: Identify and Manage Contributors  Flowsheets (Taken 8/9/2024 1538)  Self-Care Promotion:   independence encouraged   BADL personal objects within reach  Medication Review/Management: medications reviewed     " no

## 2025-05-13 NOTE — ASU PATIENT PROFILE, ADULT - NS TRANSFER HEARING AID
Patient would like a referral to Dr. Boo dermatologist for a spot on her leg.  Please call patient back at [phone]    n/a

## (undated) DEVICE — DRAPE 3/4 SHEET W REINFORCEMENT 56X77"

## (undated) DEVICE — PLV-SCD MACHINE: Type: DURABLE MEDICAL EQUIPMENT

## (undated) DEVICE — DRAPE IOBAN 23" X 23"

## (undated) DEVICE — PLV/PSP-ESU FORCEFX F8J7721A: Type: DURABLE MEDICAL EQUIPMENT

## (undated) DEVICE — GLV 8 PROTEXIS ORTHO (CREAM)

## (undated) DEVICE — PLV/PSP-ESU FORCEFX F8I7418A: Type: DURABLE MEDICAL EQUIPMENT

## (undated) DEVICE — Device

## (undated) DEVICE — KYPHON EXPRESS CURETTE SIZE 2 T-TIP 7MM

## (undated) DEVICE — PACK MINOR WITH LAP

## (undated) DEVICE — SUT POLYSORB 3-0 18" P-12 UNDYED

## (undated) DEVICE — VENODYNE/SCD SLEEVE CALF MEDIUM

## (undated) DEVICE — NDL ACCESS 11G

## (undated) DEVICE — PLV-KYPHOPLASTY SET: Type: DURABLE MEDICAL EQUIPMENT

## (undated) DEVICE — DRAPE SURGICAL #1010

## (undated) DEVICE — DRSG MASTISOL

## (undated) DEVICE — GLV 8 PROTEXIS (WHITE)

## (undated) DEVICE — VENODYNE/SCD SLEEVE CALF LARGE

## (undated) DEVICE — STAPLER SKIN PROXIMATE

## (undated) DEVICE — DRAPE TOWEL BLUE 17" X 24"

## (undated) DEVICE — WARMING BLANKET UPPER ADULT

## (undated) DEVICE — KYPHON BONE BIOPSY DEVICE SIZE 3

## (undated) DEVICE — PLV-AQUAMANTYS MACHINE #1 TL200-2981: Type: DURABLE MEDICAL EQUIPMENT

## (undated) DEVICE — DRAPE LIGHT HANDLE COVER (GREEN)

## (undated) DEVICE — DRAPE C ARM UNIVERSAL

## (undated) DEVICE — POSITIONER CUSHION INSERT PRONE VIEW LG

## (undated) DEVICE — WARMING BLANKET LOWER ADULT

## (undated) DEVICE — DRSG DERMABOND 0.7ML